# Patient Record
Sex: FEMALE | Race: WHITE | Employment: UNEMPLOYED | URBAN - METROPOLITAN AREA
[De-identification: names, ages, dates, MRNs, and addresses within clinical notes are randomized per-mention and may not be internally consistent; named-entity substitution may affect disease eponyms.]

---

## 2017-12-01 PROBLEM — M25.374: Status: ACTIVE | Noted: 2017-12-01

## 2017-12-01 PROBLEM — S86.111S: Status: ACTIVE | Noted: 2017-12-01

## 2018-03-14 ENCOUNTER — HOSPITAL ENCOUNTER (OUTPATIENT)
Dept: ULTRASOUND IMAGING | Age: 68
Discharge: HOME | End: 2018-03-14
Payer: MEDICARE

## 2018-03-14 DIAGNOSIS — E04.2 AUTOSOMAL DOMINANT MULTINODULAR GOITER ASSOCIATED WITH MUTATION IN DICER1 GENE: ICD-10-CM

## 2018-03-14 PROCEDURE — 76536 US EXAM OF HEAD AND NECK: CPT

## 2018-06-04 ENCOUNTER — APPOINTMENT (OUTPATIENT)
Dept: GENERAL RADIOLOGY | Age: 68
End: 2018-06-04
Payer: MEDICARE

## 2018-06-04 ENCOUNTER — HOSPITAL ENCOUNTER (EMERGENCY)
Age: 68
Discharge: HOME | End: 2018-06-04
Payer: MEDICARE

## 2018-06-04 VITALS
HEIGHT: 65 IN | HEART RATE: 62 BPM | TEMPERATURE: 98 F | WEIGHT: 247 LBS | DIASTOLIC BLOOD PRESSURE: 97 MMHG | RESPIRATION RATE: 18 BRPM | OXYGEN SATURATION: 95 % | SYSTOLIC BLOOD PRESSURE: 156 MMHG | BODY MASS INDEX: 41.15 KG/M2

## 2018-06-04 DIAGNOSIS — M79.622 LEFT UPPER ARM PAIN: ICD-10-CM

## 2018-06-04 DIAGNOSIS — S80.01XA CONTUSION OF RIGHT KNEE, INITIAL ENCOUNTER: ICD-10-CM

## 2018-06-04 DIAGNOSIS — S40.012A CONTUSION OF LEFT SHOULDER, INITIAL ENCOUNTER: Primary | ICD-10-CM

## 2018-06-04 PROCEDURE — 73060 X-RAY EXAM OF HUMERUS: CPT

## 2018-06-04 PROCEDURE — 99284 EMERGENCY DEPT VISIT MOD MDM: CPT

## 2018-06-04 PROCEDURE — 73030 X-RAY EXAM OF SHOULDER: CPT

## 2018-06-04 ASSESSMENT — PAIN DESCRIPTION - LOCATION: LOCATION: SHOULDER

## 2018-06-04 ASSESSMENT — PAIN SCALES - GENERAL: PAINLEVEL_OUTOF10: 8

## 2018-06-04 ASSESSMENT — PAIN DESCRIPTION - ORIENTATION: ORIENTATION: LEFT

## 2018-09-06 ENCOUNTER — HOSPITAL ENCOUNTER (OUTPATIENT)
Age: 68
Discharge: HOME | End: 2018-09-06
Payer: MEDICARE

## 2018-09-06 LAB
EKG ATRIAL RATE: 63 BPM
EKG P AXIS: 33 DEGREES
EKG P-R INTERVAL: 132 MS
EKG Q-T INTERVAL: 402 MS
EKG QRS DURATION: 78 MS
EKG QTC CALCULATION (BAZETT): 411 MS
EKG R AXIS: 39 DEGREES
EKG T AXIS: 26 DEGREES
EKG VENTRICULAR RATE: 63 BPM

## 2018-09-06 PROCEDURE — 93005 ELECTROCARDIOGRAM TRACING: CPT | Performed by: ANESTHESIOLOGY

## 2018-09-07 ENCOUNTER — HOSPITAL ENCOUNTER (OUTPATIENT)
Age: 68
Setting detail: OUTPATIENT SURGERY
Discharge: HOME | End: 2018-09-07
Attending: PODIATRIST | Admitting: PODIATRIST
Payer: MEDICARE

## 2018-09-07 ENCOUNTER — ANESTHESIA EVENT (OUTPATIENT)
Dept: OPERATING ROOM | Age: 68
End: 2018-09-07
Payer: MEDICARE

## 2018-09-07 ENCOUNTER — ANESTHESIA (OUTPATIENT)
Dept: OPERATING ROOM | Age: 68
End: 2018-09-07
Payer: MEDICARE

## 2018-09-07 VITALS — SYSTOLIC BLOOD PRESSURE: 112 MMHG | DIASTOLIC BLOOD PRESSURE: 64 MMHG | TEMPERATURE: 98.6 F | OXYGEN SATURATION: 99 %

## 2018-09-07 VITALS
BODY MASS INDEX: 41.15 KG/M2 | TEMPERATURE: 98.6 F | OXYGEN SATURATION: 96 % | HEIGHT: 65 IN | WEIGHT: 247 LBS | HEART RATE: 50 BPM | RESPIRATION RATE: 14 BRPM | SYSTOLIC BLOOD PRESSURE: 155 MMHG | DIASTOLIC BLOOD PRESSURE: 71 MMHG

## 2018-09-07 DIAGNOSIS — S86.111S POSTERIOR TIBIAL TENDON TEAR, TRAUMATIC, RIGHT, SEQUELA: ICD-10-CM

## 2018-09-07 DIAGNOSIS — T85.848D PAIN FROM IMPLANTED HARDWARE, SUBSEQUENT ENCOUNTER: Primary | ICD-10-CM

## 2018-09-07 DIAGNOSIS — Z47.2 AFTERCARE FOR REMOVAL OF FRACTURE PLATE OR INTERNAL FIXATION DEVICE: ICD-10-CM

## 2018-09-07 PROBLEM — T85.848A PAIN FROM IMPLANTED HARDWARE: Status: ACTIVE | Noted: 2018-09-07

## 2018-09-07 PROCEDURE — 6360000002 HC RX W HCPCS: Performed by: NURSE ANESTHETIST, CERTIFIED REGISTERED

## 2018-09-07 PROCEDURE — 3600000003 HC SURGERY LEVEL 3 BASE: Performed by: PODIATRIST

## 2018-09-07 PROCEDURE — 2709999900 HC NON-CHARGEABLE SUPPLY: Performed by: PODIATRIST

## 2018-09-07 PROCEDURE — 2500000003 HC RX 250 WO HCPCS: Performed by: PODIATRIST

## 2018-09-07 PROCEDURE — 3700000000 HC ANESTHESIA ATTENDED CARE: Performed by: PODIATRIST

## 2018-09-07 PROCEDURE — 2580000003 HC RX 258: Performed by: PODIATRIST

## 2018-09-07 PROCEDURE — 3600000013 HC SURGERY LEVEL 3 ADDTL 15MIN: Performed by: PODIATRIST

## 2018-09-07 PROCEDURE — 6360000002 HC RX W HCPCS: Performed by: PODIATRIST

## 2018-09-07 PROCEDURE — 3700000001 HC ADD 15 MINUTES (ANESTHESIA): Performed by: PODIATRIST

## 2018-09-07 PROCEDURE — C9359 IMPLNT,BON VOID FILLER-PUTTY: HCPCS | Performed by: PODIATRIST

## 2018-09-07 PROCEDURE — 7100000011 HC PHASE II RECOVERY - ADDTL 15 MIN: Performed by: PODIATRIST

## 2018-09-07 PROCEDURE — 2580000003 HC RX 258: Performed by: INTERNAL MEDICINE

## 2018-09-07 PROCEDURE — C1713 ANCHOR/SCREW BN/BN,TIS/BN: HCPCS | Performed by: PODIATRIST

## 2018-09-07 PROCEDURE — 7100000010 HC PHASE II RECOVERY - FIRST 15 MIN: Performed by: PODIATRIST

## 2018-09-07 DEVICE — INJECTABLE PUTTY
Type: IMPLANTABLE DEVICE | Site: FOOT | Status: FUNCTIONAL
Brand: ALLOMATRIX

## 2018-09-07 RX ORDER — HYDROCODONE BITARTRATE AND ACETAMINOPHEN 5; 325 MG/1; MG/1
2 TABLET ORAL PRN
Status: DISCONTINUED | OUTPATIENT
Start: 2018-09-07 | End: 2018-09-07 | Stop reason: HOSPADM

## 2018-09-07 RX ORDER — FENTANYL CITRATE 50 UG/ML
25 INJECTION, SOLUTION INTRAMUSCULAR; INTRAVENOUS EVERY 5 MIN PRN
Status: DISCONTINUED | OUTPATIENT
Start: 2018-09-07 | End: 2018-09-07 | Stop reason: HOSPADM

## 2018-09-07 RX ORDER — SODIUM CHLORIDE, SODIUM LACTATE, POTASSIUM CHLORIDE, CALCIUM CHLORIDE 600; 310; 30; 20 MG/100ML; MG/100ML; MG/100ML; MG/100ML
INJECTION, SOLUTION INTRAVENOUS CONTINUOUS
Status: DISCONTINUED | OUTPATIENT
Start: 2018-09-07 | End: 2018-09-07 | Stop reason: HOSPADM

## 2018-09-07 RX ORDER — FENTANYL CITRATE 50 UG/ML
50 INJECTION, SOLUTION INTRAMUSCULAR; INTRAVENOUS EVERY 5 MIN PRN
Status: DISCONTINUED | OUTPATIENT
Start: 2018-09-07 | End: 2018-09-07 | Stop reason: HOSPADM

## 2018-09-07 RX ORDER — PROPOFOL 10 MG/ML
INJECTION, EMULSION INTRAVENOUS CONTINUOUS PRN
Status: DISCONTINUED | OUTPATIENT
Start: 2018-09-07 | End: 2018-09-07 | Stop reason: SDUPTHER

## 2018-09-07 RX ORDER — KETOROLAC TROMETHAMINE 30 MG/ML
INJECTION, SOLUTION INTRAMUSCULAR; INTRAVENOUS PRN
Status: DISCONTINUED | OUTPATIENT
Start: 2018-09-07 | End: 2018-09-07 | Stop reason: SDUPTHER

## 2018-09-07 RX ORDER — OXYCODONE HYDROCHLORIDE AND ACETAMINOPHEN 5; 325 MG/1; MG/1
1 TABLET ORAL EVERY 6 HOURS PRN
Qty: 28 TABLET | Refills: 0 | Status: SHIPPED | OUTPATIENT
Start: 2018-09-07 | End: 2018-09-14

## 2018-09-07 RX ORDER — FENTANYL CITRATE 50 UG/ML
INJECTION, SOLUTION INTRAMUSCULAR; INTRAVENOUS PRN
Status: DISCONTINUED | OUTPATIENT
Start: 2018-09-07 | End: 2018-09-07 | Stop reason: SDUPTHER

## 2018-09-07 RX ORDER — MEPERIDINE HYDROCHLORIDE 25 MG/ML
12.5 INJECTION INTRAMUSCULAR; INTRAVENOUS; SUBCUTANEOUS EVERY 5 MIN PRN
Status: DISCONTINUED | OUTPATIENT
Start: 2018-09-07 | End: 2018-09-07 | Stop reason: HOSPADM

## 2018-09-07 RX ORDER — HYDROCODONE BITARTRATE AND ACETAMINOPHEN 5; 325 MG/1; MG/1
1 TABLET ORAL PRN
Status: DISCONTINUED | OUTPATIENT
Start: 2018-09-07 | End: 2018-09-07 | Stop reason: HOSPADM

## 2018-09-07 RX ORDER — MIDAZOLAM HYDROCHLORIDE 1 MG/ML
INJECTION INTRAMUSCULAR; INTRAVENOUS PRN
Status: DISCONTINUED | OUTPATIENT
Start: 2018-09-07 | End: 2018-09-07 | Stop reason: SDUPTHER

## 2018-09-07 RX ORDER — PROPOFOL 10 MG/ML
INJECTION, EMULSION INTRAVENOUS PRN
Status: DISCONTINUED | OUTPATIENT
Start: 2018-09-07 | End: 2018-09-07 | Stop reason: SDUPTHER

## 2018-09-07 RX ADMIN — KETOROLAC TROMETHAMINE 30 MG: 30 INJECTION, SOLUTION INTRAMUSCULAR at 11:30

## 2018-09-07 RX ADMIN — PROPOFOL 150 MCG/KG/MIN: 10 INJECTION, EMULSION INTRAVENOUS at 11:03

## 2018-09-07 RX ADMIN — VANCOMYCIN HYDROCHLORIDE 500 MG: 500 INJECTION, POWDER, LYOPHILIZED, FOR SOLUTION INTRAVENOUS at 10:44

## 2018-09-07 RX ADMIN — MIDAZOLAM 1 MG: 1 INJECTION INTRAMUSCULAR; INTRAVENOUS at 11:17

## 2018-09-07 RX ADMIN — MIDAZOLAM 1 MG: 1 INJECTION INTRAMUSCULAR; INTRAVENOUS at 10:56

## 2018-09-07 RX ADMIN — PROPOFOL 60 MG: 10 INJECTION, EMULSION INTRAVENOUS at 11:03

## 2018-09-07 RX ADMIN — PROPOFOL 20 MG: 10 INJECTION, EMULSION INTRAVENOUS at 10:56

## 2018-09-07 RX ADMIN — FENTANYL CITRATE 50 MCG: 50 INJECTION INTRAMUSCULAR; INTRAVENOUS at 11:00

## 2018-09-07 RX ADMIN — SODIUM CHLORIDE, POTASSIUM CHLORIDE, SODIUM LACTATE AND CALCIUM CHLORIDE: 600; 310; 30; 20 INJECTION, SOLUTION INTRAVENOUS at 10:10

## 2018-09-07 RX ADMIN — FENTANYL CITRATE 50 MCG: 50 INJECTION INTRAMUSCULAR; INTRAVENOUS at 10:56

## 2018-09-07 RX ADMIN — LIDOCAINE HYDROCHLORIDE 25 MG: 20 INJECTION, SOLUTION INTRAVENOUS at 11:03

## 2018-09-07 ASSESSMENT — PULMONARY FUNCTION TESTS
PIF_VALUE: 0

## 2018-09-07 ASSESSMENT — PAIN DESCRIPTION - DESCRIPTORS: DESCRIPTORS: ACHING;DISCOMFORT

## 2018-09-07 ASSESSMENT — PAIN SCALES - GENERAL
PAINLEVEL_OUTOF10: 0

## 2018-09-07 ASSESSMENT — PAIN - FUNCTIONAL ASSESSMENT: PAIN_FUNCTIONAL_ASSESSMENT: 0-10

## 2018-09-07 NOTE — BRIEF OP NOTE
Brief Postoperative Note  ______________________________________________________________    Patient: Camila Goldmann  YOB: 1950  MRN: [id number]  Date of Procedure: 9/7/2018    Pre-Op Diagnosis: SURGICAL FOREIGN BODY    Post-Op Diagnosis: Same       Procedure(s):  REMOVAL OF SURGICAL SCREW RIGHT FOOT, WITH BONE ALLOMATRIX PUTTY INJECTION    Anesthesia: Monitor Anesthesia Care    Surgeon(s):  Chilango Nelson DPM    Staff:  First Assistant: Lalo Salgado RN  Scrub Person First: Caleb Campbell     Estimated Blood Loss: * No values recorded between 9/7/2018 10:55 AM and 9/7/2018 69:65 AM * mL    Complications: None    Specimens:   * No specimens in log *    Implants:    Implant Name Type Inv.  Item Serial No.  Lot No. LRB No. Used   MUC SCREW    Poliana  Right 1   ALLEY-PUTTY SUBST DBM INJ 1CC Bone/Graft/Tissue ALLEY-PUTTY SUBST [facility] INJ [facility] [employer] [id number] Right 1         Drains:      Findings: consistent with Dx    Chilango Champion DPM  Date: 9/7/2018  Time: 11:38 AM

## 2018-09-07 NOTE — ANESTHESIA PRE PROCEDURE
Department of Anesthesiology  Preprocedure Note       Name:  Rocio Hare   Age:  79 y.o.  :  1950                                          MRN:  [id number]         Date:  2018      Surgeon: Li Hook):  Wilbert Rayo DPM    Procedure: Procedure(s):  REMOVAL OF SURGICAL SCREW RIGHT FOOT    Medications prior to admission:   Prior to Admission medications    Medication Sig Start Date End Date Taking? Authorizing Provider   Cimetidine (TAGAMET PO) Take by mouth    Historical Provider, MD   vitamin D (CHOLECALCIFEROL) 1000 UNIT TABS tablet Take 1,000 Units by mouth daily    Historical Provider, MD   Glucosamine 750 MG TABS Take by mouth    Historical Provider, MD       Current medications:    Current Facility-Administered Medications   Medication Dose Route Frequency Provider Last Rate Last Dose    vancomycin (VANCOCIN) 500 mg in dextrose 5 % 100 mL IVPB (mini-bag)  500 mg Intravenous Once Wilbert Rayo DPM        lactated ringers infusion   Intravenous Continuous Jonny Deleon  mL/hr at 18 1010      sodium chloride 0.9 % 1,000 mL with bacitracin 50,000 Units, polymyxin B 500,000 Units    PRN Wilbert Rayo, DPM   1,000 mL at 18 1100    bupivacaine (MARCAINE) 5 mL, lidocaine 2 % 5 mL    PRN Wilbert Perla., DPM   10 mL at 18 1100       Allergies:     Allergies   Allergen Reactions    Ciprofloxacin      itch    Keflex [Cephalexin] Itching    Sulfa Antibiotics Rash       Problem List:    Patient Active Problem List   Diagnosis Code    Posterior tibial tendon tear, traumatic, right, sequela S86.111S    Subtalar joint instability, 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

## 2018-09-08 NOTE — OP NOTE
[address]                                 OPERATIVE REPORT    PATIENT NAME: Taryn Mak                  :        1950  MED REC NO:   [id number]                            ROOM:  ACCOUNT NO:   [de-identified]                           ADMIT DATE: 2018  PROVIDER:     María Elena Rod DPM    DATE OF PROCEDURE:  2018    PREOPERATIVE DIAGNOSIS:  Painful surgical screw, right heel. POSTOPERATIVE DIAGNOSIS:  Painful surgical screw, right heel. PROCEDURE PERFORMED:  Removal of painful surgical screw, right heel. SURGEON:  María Elena Rod DPM    ASSISTANT:  None. ANESTHESIA:  Local with IV sedation. DESCRIPTION OF PROCEDURE:  The patient presented to the operating room and  placed on the operating table in the supine position. The patient  underwent IV sedation, was administered by the Department of Anesthesiology  of the [facility]. Once the patient was sedated, the  patient's operative foot was localized by the surgeon, comprising with  equal mixture of 0.5% Marcaine plain along with 2% lidocaine plain,  totaling 10 mL. After which time, the patient's foot was prepared,  scrubbed and draped in a sterile fashion. No tourniquet or epinephrine  utilized. Attention was directed to the plantar aspect of the foot, where a linear  incision was centered over the original incision site, it was made with a  #10-blade. Incision was deepened with blunt dissection with a hemostat. Hemostat was able to find the head of the surgical screw. The K-wire was  then used to go through the surgical screw, this allowed the screwdriver to  lock into the head. The screw was then removed in a retrograde fashion. The surgical site was flushed with antibiotic solution. At this time,  AlloMatrix putty mixed with bone Matrix was then used to fill the void.    Once, it was filled, the

## 2019-04-24 ENCOUNTER — HOSPITAL ENCOUNTER (OUTPATIENT)
Dept: ULTRASOUND IMAGING | Age: 69
Discharge: HOME | End: 2019-04-24
Payer: MEDICARE

## 2019-04-24 DIAGNOSIS — E04.2 MULTINODULAR GOITER: ICD-10-CM

## 2019-04-24 PROCEDURE — 76536 US EXAM OF HEAD AND NECK: CPT

## 2019-06-25 ENCOUNTER — HOSPITAL ENCOUNTER (OUTPATIENT)
Dept: ULTRASOUND IMAGING | Age: 69
Discharge: HOME | End: 2019-06-25
Payer: MEDICARE

## 2019-06-25 DIAGNOSIS — E04.1 LEFT THYROID NODULE: ICD-10-CM

## 2019-06-25 PROCEDURE — 10005 FNA BX W/US GDN 1ST LES: CPT

## 2019-06-25 PROCEDURE — 88173 CYTOPATH EVAL FNA REPORT: CPT

## 2019-06-25 PROCEDURE — 88305 TISSUE EXAM BY PATHOLOGIST: CPT

## 2019-10-28 ENCOUNTER — HOSPITAL ENCOUNTER (OUTPATIENT)
Dept: ULTRASOUND IMAGING | Age: 69
Discharge: HOME | End: 2019-10-28
Payer: MEDICARE

## 2019-10-28 DIAGNOSIS — E04.2 MULTINODULAR GOITER: ICD-10-CM

## 2019-10-28 PROCEDURE — 76536 US EXAM OF HEAD AND NECK: CPT

## 2022-10-03 PROBLEM — R00.1 BRADYCARDIA: Status: ACTIVE | Noted: 2022-10-03

## 2022-10-03 PROBLEM — Z01.818 PRE-OP EVALUATION: Status: ACTIVE | Noted: 2022-10-03

## 2022-10-06 PROBLEM — M19.072 ARTHRITIS OF LEFT SUBTALAR JOINT: Status: ACTIVE | Noted: 2022-10-06

## 2022-10-06 PROBLEM — M19.079 ARTHRITIS, MIDFOOT: Status: ACTIVE | Noted: 2022-10-06

## 2022-11-02 PROBLEM — Z01.818 PRE-OP EVALUATION: Status: RESOLVED | Noted: 2022-10-03 | Resolved: 2022-11-02

## 2023-04-04 ENCOUNTER — TELEPHONE (OUTPATIENT)
Dept: PRIMARY CARE | Facility: CLINIC | Age: 73
End: 2023-04-04
Payer: MEDICARE

## 2023-04-04 DIAGNOSIS — N30.01 ACUTE CYSTITIS WITH HEMATURIA: Primary | ICD-10-CM

## 2023-04-04 PROBLEM — N39.0 URINARY TRACT INFECTION: Status: ACTIVE | Noted: 2023-04-04

## 2023-04-04 RX ORDER — NITROFURANTOIN 25; 75 MG/1; MG/1
100 CAPSULE ORAL EVERY 12 HOURS
COMMUNITY
Start: 2022-11-17 | End: 2023-04-04 | Stop reason: SDUPTHER

## 2023-04-04 RX ORDER — NITROFURANTOIN 25; 75 MG/1; MG/1
100 CAPSULE ORAL EVERY 12 HOURS
Qty: 10 CAPSULE | Refills: 0 | Status: SHIPPED | OUTPATIENT
Start: 2023-04-04 | End: 2023-06-21

## 2023-04-04 NOTE — TELEPHONE ENCOUNTER
Jenae Landa has a very bad uti with bleeding.  Multiple allergies.  Could she ge an antibiotic.  The las stuff she had was macrobid and that helped.  Uses [facility] [phone]

## 2023-06-19 ENCOUNTER — OFFICE VISIT (OUTPATIENT)
Dept: PRIMARY CARE | Facility: CLINIC | Age: 73
End: 2023-06-19
Payer: MEDICARE

## 2023-06-19 VITALS
DIASTOLIC BLOOD PRESSURE: 78 MMHG | WEIGHT: 255 LBS | HEIGHT: 64 IN | HEART RATE: 58 BPM | BODY MASS INDEX: 43.54 KG/M2 | SYSTOLIC BLOOD PRESSURE: 132 MMHG | OXYGEN SATURATION: 97 %

## 2023-06-19 DIAGNOSIS — I10 PRIMARY HYPERTENSION: Primary | ICD-10-CM

## 2023-06-19 DIAGNOSIS — Z12.31 SCREENING MAMMOGRAM, ENCOUNTER FOR: ICD-10-CM

## 2023-06-19 DIAGNOSIS — M19.079 ARTHRITIS, MIDFOOT: ICD-10-CM

## 2023-06-19 PROBLEM — S86.301A INJURY OF PERONEAL TENDON OF RIGHT FOOT: Status: ACTIVE | Noted: 2023-06-19

## 2023-06-19 PROBLEM — G89.29 CHRONIC PAIN OF RIGHT ANKLE: Status: ACTIVE | Noted: 2023-06-19

## 2023-06-19 PROBLEM — K63.5 COLON POLYP: Status: ACTIVE | Noted: 2023-06-19

## 2023-06-19 PROBLEM — R00.1 BRADYCARDIA: Status: RESOLVED | Noted: 2022-10-03 | Resolved: 2023-06-19

## 2023-06-19 PROBLEM — E78.1 ESSENTIAL HYPERTRIGLYCERIDEMIA: Status: ACTIVE | Noted: 2023-06-19

## 2023-06-19 PROBLEM — F41.9 ANXIETY: Status: ACTIVE | Noted: 2023-06-19

## 2023-06-19 PROBLEM — J30.9 ALLERGIC RHINITIS: Status: ACTIVE | Noted: 2023-06-19

## 2023-06-19 PROBLEM — E04.1 THYROID NODULE: Status: ACTIVE | Noted: 2023-06-19

## 2023-06-19 PROBLEM — M25.571 CHRONIC PAIN OF RIGHT ANKLE: Status: ACTIVE | Noted: 2023-06-19

## 2023-06-19 PROBLEM — M19.071 ARTHRITIS OF ANKLE, RIGHT: Status: ACTIVE | Noted: 2023-06-19

## 2023-06-19 PROBLEM — K21.9 GERD WITHOUT ESOPHAGITIS: Status: ACTIVE | Noted: 2023-06-19

## 2023-06-19 PROBLEM — M19.072 ARTHRITIS OF ANKLE, LEFT: Status: ACTIVE | Noted: 2023-06-19

## 2023-06-19 PROBLEM — S86.111S: Status: ACTIVE | Noted: 2017-12-01

## 2023-06-19 PROBLEM — H35.30 MACULAR DEGENERATION: Status: ACTIVE | Noted: 2023-06-19

## 2023-06-19 LAB
ALBUMIN (G/DL) IN SER/PLAS: 3.9 G/DL (ref 3.4–5)
ANION GAP IN SER/PLAS: 15 MMOL/L (ref 10–20)
BASOPHILS (10*3/UL) IN BLOOD BY AUTOMATED COUNT: 0.02 X10E9/L (ref 0–0.1)
BASOPHILS/100 LEUKOCYTES IN BLOOD BY AUTOMATED COUNT: 0.3 % (ref 0–2)
CALCIUM (MG/DL) IN SER/PLAS: 8.6 MG/DL (ref 8.6–10.3)
CARBON DIOXIDE, TOTAL (MMOL/L) IN SER/PLAS: 28 MMOL/L (ref 21–32)
CHLORIDE (MMOL/L) IN SER/PLAS: 100 MMOL/L (ref 98–107)
CHOLESTEROL (MG/DL) IN SER/PLAS: 184 MG/DL (ref 0–199)
CHOLESTEROL IN HDL (MG/DL) IN SER/PLAS: 40.5 MG/DL
CHOLESTEROL/HDL RATIO: 4.5
CREATININE (MG/DL) IN SER/PLAS: 0.96 MG/DL (ref 0.5–1.05)
EOSINOPHILS (10*3/UL) IN BLOOD BY AUTOMATED COUNT: 0.12 X10E9/L (ref 0–0.4)
EOSINOPHILS/100 LEUKOCYTES IN BLOOD BY AUTOMATED COUNT: 1.6 % (ref 0–6)
ERYTHROCYTE DISTRIBUTION WIDTH (RATIO) BY AUTOMATED COUNT: 15.4 % (ref 11.5–14.5)
ERYTHROCYTE MEAN CORPUSCULAR HEMOGLOBIN CONCENTRATION (G/DL) BY AUTOMATED: 30.3 G/DL (ref 32–36)
ERYTHROCYTE MEAN CORPUSCULAR VOLUME (FL) BY AUTOMATED COUNT: 83 FL (ref 80–100)
ERYTHROCYTES (10*6/UL) IN BLOOD BY AUTOMATED COUNT: 5.04 X10E12/L (ref 4–5.2)
GFR FEMALE: 63 ML/MIN/1.73M2
GLUCOSE (MG/DL) IN SER/PLAS: 83 MG/DL (ref 74–99)
HEMATOCRIT (%) IN BLOOD BY AUTOMATED COUNT: 41.9 % (ref 36–46)
HEMOGLOBIN (G/DL) IN BLOOD: 12.7 G/DL (ref 12–16)
IMMATURE GRANULOCYTES/100 LEUKOCYTES IN BLOOD BY AUTOMATED COUNT: 0.3 % (ref 0–0.9)
LDL: 109 MG/DL (ref 0–99)
LEUKOCYTES (10*3/UL) IN BLOOD BY AUTOMATED COUNT: 7.3 X10E9/L (ref 4.4–11.3)
LYMPHOCYTES (10*3/UL) IN BLOOD BY AUTOMATED COUNT: 1.92 X10E9/L (ref 0.8–3)
LYMPHOCYTES/100 LEUKOCYTES IN BLOOD BY AUTOMATED COUNT: 26.4 % (ref 13–44)
MONOCYTES (10*3/UL) IN BLOOD BY AUTOMATED COUNT: 0.38 X10E9/L (ref 0.05–0.8)
MONOCYTES/100 LEUKOCYTES IN BLOOD BY AUTOMATED COUNT: 5.2 % (ref 2–10)
NEUTROPHILS (10*3/UL) IN BLOOD BY AUTOMATED COUNT: 4.82 X10E9/L (ref 1.6–5.5)
NEUTROPHILS/100 LEUKOCYTES IN BLOOD BY AUTOMATED COUNT: 66.2 % (ref 40–80)
PHOSPHATE (MG/DL) IN SER/PLAS: 3.7 MG/DL (ref 2.5–4.9)
PLATELETS (10*3/UL) IN BLOOD AUTOMATED COUNT: 274 X10E9/L (ref 150–450)
POTASSIUM (MMOL/L) IN SER/PLAS: 4.1 MMOL/L (ref 3.5–5.3)
SODIUM (MMOL/L) IN SER/PLAS: 139 MMOL/L (ref 136–145)
TRIGLYCERIDE (MG/DL) IN SER/PLAS: 172 MG/DL (ref 0–149)
UREA NITROGEN (MG/DL) IN SER/PLAS: 25 MG/DL (ref 6–23)
VLDL: 34 MG/DL (ref 0–40)

## 2023-06-19 PROCEDURE — 85025 COMPLETE CBC W/AUTO DIFF WBC: CPT

## 2023-06-19 PROCEDURE — 3075F SYST BP GE 130 - 139MM HG: CPT | Performed by: FAMILY MEDICINE

## 2023-06-19 PROCEDURE — 99214 OFFICE O/P EST MOD 30 MIN: CPT | Performed by: FAMILY MEDICINE

## 2023-06-19 PROCEDURE — 1159F MED LIST DOCD IN RCRD: CPT | Performed by: FAMILY MEDICINE

## 2023-06-19 PROCEDURE — 1160F RVW MEDS BY RX/DR IN RCRD: CPT | Performed by: FAMILY MEDICINE

## 2023-06-19 PROCEDURE — 3078F DIAST BP <80 MM HG: CPT | Performed by: FAMILY MEDICINE

## 2023-06-19 PROCEDURE — 1170F FXNL STATUS ASSESSED: CPT | Performed by: FAMILY MEDICINE

## 2023-06-19 PROCEDURE — G0439 PPPS, SUBSEQ VISIT: HCPCS | Performed by: FAMILY MEDICINE

## 2023-06-19 PROCEDURE — 80069 RENAL FUNCTION PANEL: CPT

## 2023-06-19 PROCEDURE — 80061 LIPID PANEL: CPT

## 2023-06-19 PROCEDURE — 99397 PER PM REEVAL EST PAT 65+ YR: CPT | Performed by: FAMILY MEDICINE

## 2023-06-19 PROCEDURE — 1036F TOBACCO NON-USER: CPT | Performed by: FAMILY MEDICINE

## 2023-06-19 RX ORDER — CYCLOPENTOLATE HYDROCHLORIDE 10 MG/ML
SOLUTION/ DROPS OPHTHALMIC
COMMUNITY
Start: 2021-10-15

## 2023-06-19 RX ORDER — TRIAMTERENE AND HYDROCHLOROTHIAZIDE 37.5; 25 MG/1; MG/1
CAPSULE ORAL
COMMUNITY
Start: 2021-04-08 | End: 2023-06-19 | Stop reason: SDUPTHER

## 2023-06-19 RX ORDER — CHOLECALCIFEROL (VITAMIN D3) 25 MCG
1000 TABLET ORAL
COMMUNITY

## 2023-06-19 RX ORDER — ZINC GLUCONATE 13.3 MG
LOZENGE ORAL
COMMUNITY

## 2023-06-19 RX ORDER — IBUPROFEN 200 MG
200 TABLET ORAL EVERY 6 HOURS PRN
COMMUNITY
End: 2024-02-03 | Stop reason: ALTCHOICE

## 2023-06-19 RX ORDER — MULTIVITAMIN
1 TABLET ORAL DAILY
COMMUNITY
End: 2023-06-19 | Stop reason: ALTCHOICE

## 2023-06-19 RX ORDER — DICLOFENAC SODIUM 10 MG/G
4 GEL TOPICAL 4 TIMES DAILY PRN
Qty: 20 G | Refills: 0 | COMMUNITY
Start: 2023-06-19 | End: 2023-07-09

## 2023-06-19 RX ORDER — ASPIRIN 81 MG/1
1 TABLET ORAL
COMMUNITY
Start: 2022-10-06 | End: 2024-02-03 | Stop reason: ALTCHOICE

## 2023-06-19 RX ORDER — TRIAMTERENE AND HYDROCHLOROTHIAZIDE 37.5; 25 MG/1; MG/1
CAPSULE ORAL
Qty: 90 CAPSULE | Refills: 3 | Status: SHIPPED | OUTPATIENT
Start: 2023-06-19 | End: 2023-06-21

## 2023-06-19 ASSESSMENT — ENCOUNTER SYMPTOMS
APPETITE CHANGE: 0
FEVER: 0
CHILLS: 0
ACTIVITY CHANGE: 0
COUGH: 1

## 2023-06-19 ASSESSMENT — ACTIVITIES OF DAILY LIVING (ADL)
DOING_HOUSEWORK: INDEPENDENT
GROCERY_SHOPPING: INDEPENDENT
DRESSING: INDEPENDENT
BATHING: INDEPENDENT
TAKING_MEDICATION: INDEPENDENT
MANAGING_FINANCES: INDEPENDENT

## 2023-06-19 NOTE — PROGRESS NOTES
"Subjective   Reason for Visit: Jenae Delvalle is an 73 y.o. female here for a Medicare Wellness visit.     Past Medical, Surgical, and Family History reviewed and updated in chart.    Reviewed all medications by prescribing practitioner or clinical pharmacist (such as prescriptions, OTCs, herbal therapies and supplements) and documented in the medical record.    HPI  Patient also in for yearly checkup renewal of medicine  She takes triamterene hydrochlorothiazide for blood pressure also helps with edema in her legs  Patient did have foot surgery in the past  Chronic pain  Does limit her ambulation  Otherwise denies any shortness of breath chest pain pressure palpitations  He is getting over a cold she had a couple weeks ago is improving still with slight cough and postnasal drainage    Patient Care Team:  Juan Mooney DO as PCP - General  Juan Mooney DO as PCP - [employer] PCP     Review of Systems   Constitutional:  Negative for activity change, appetite change, chills and fever.   HENT:  Positive for postnasal drip. Negative for congestion.    Respiratory:  Positive for cough.         Sl cough getting better      Shots in R eye MD     Objective   Vitals:  BP (!) 130/94 (BP Location: Left arm, Patient Position: Sitting, BP Cuff Size: Large adult)   Pulse 58   Ht 1.619 m (5' 3.75\")   Wt 116 kg (255 lb)   SpO2 97%   BMI 44.11 kg/m²       Physical Exam  General: alert, no apparent distress, good hygiene   HEAD:  Normocephalic, atraumatic    EARS:  EAC patent, TMs normal,   EYES:  sclera white, ROMINA, conjunctiva noninjected  NOSE: Nasal passages patent   MOUTH: Pharynx clear, tongue uvula midline, grade 4 airway  Neck:  supple, no masses, thyroid non enlarged non nodular, no cervical adenopathy,  Lungs:  no wheezing, no rales , no rhonchi, normal respiratory pattern, breath sounds not diminished  Heart:  regular rate and  rhythm, 2/6 RUSB murmur, no ectopy, no S3 or S4, no carotid " bruits  Abdomen:  soft NT,BS + ,  no organomegaly, no masses, no bruits  Extremities:  trace R>L  edema,mild chronic stasis changes both ankles,+mod varicosities , no cyanosis, no clubbing,  2+ posterior tibialis pulse    Psych:  speech fluent, normal affect, normal thought process  Skin:  no rashes, no concerning skin lesions, normal texture      Assessment/Plan   Problem List Items Addressed This Visit       Arthritis, midfoot    Hypertension - Primary     Other Visit Diagnoses       Screening mammogram, encounter for            Discussed with patient should help any shortness of breath palpitations would recommend echocardiogram.  Continue current diuretic for blood pressure renewal sent for 1 year.  Lab done.  Patient does not do vaccines.  Pneumococcal and shingles vaccines would be recommended  Mammogram was ordered.

## 2023-06-21 DIAGNOSIS — Z12.31 SCREENING MAMMOGRAM, ENCOUNTER FOR: ICD-10-CM

## 2023-06-21 DIAGNOSIS — N30.01 ACUTE CYSTITIS WITH HEMATURIA: ICD-10-CM

## 2023-06-21 RX ORDER — TRIAMTERENE AND HYDROCHLOROTHIAZIDE 37.5; 25 MG/1; MG/1
CAPSULE ORAL
Qty: 90 CAPSULE | Refills: 3 | Status: SHIPPED | OUTPATIENT
Start: 2023-06-21

## 2023-06-21 RX ORDER — NITROFURANTOIN 25; 75 MG/1; MG/1
CAPSULE ORAL
Qty: 10 CAPSULE | Refills: 0 | Status: SHIPPED | OUTPATIENT
Start: 2023-06-21 | End: 2024-02-03 | Stop reason: ALTCHOICE

## 2023-07-18 ENCOUNTER — OFFICE VISIT (OUTPATIENT)
Dept: PRIMARY CARE | Facility: CLINIC | Age: 73
End: 2023-07-18
Payer: MEDICARE

## 2023-07-18 VITALS
BODY MASS INDEX: 44.32 KG/M2 | DIASTOLIC BLOOD PRESSURE: 82 MMHG | SYSTOLIC BLOOD PRESSURE: 132 MMHG | OXYGEN SATURATION: 98 % | HEART RATE: 82 BPM | WEIGHT: 256.2 LBS

## 2023-07-18 DIAGNOSIS — M75.82 TENDINITIS OF LEFT INFRASPINATUS TENDON: ICD-10-CM

## 2023-07-18 DIAGNOSIS — D17.22 LIPOMA OF LEFT UPPER EXTREMITY: Primary | ICD-10-CM

## 2023-07-18 PROCEDURE — 99213 OFFICE O/P EST LOW 20 MIN: CPT

## 2023-07-18 PROCEDURE — 1036F TOBACCO NON-USER: CPT

## 2023-07-18 PROCEDURE — 3079F DIAST BP 80-89 MM HG: CPT

## 2023-07-18 PROCEDURE — 1160F RVW MEDS BY RX/DR IN RCRD: CPT

## 2023-07-18 PROCEDURE — 1159F MED LIST DOCD IN RCRD: CPT

## 2023-07-18 PROCEDURE — 3075F SYST BP GE 130 - 139MM HG: CPT

## 2023-07-18 NOTE — PROGRESS NOTES
Subjective   Patient ID: Jenae Delvalle is a 73 y.o. female who presents for Arm Pain (Lump on L gwyn shoulder pain).  HPI  Jenae presents for a lump on her L arm that she noticed a week ago.  She says that she had her arm up on the steering wheel when she noticed it. It does not hurt. It has not gotten bigger, if anything she thinks maybe it has gotten smaller.   She also says her whole shoulder has been sore. She thinks she hurt it when she was using a push cultivator in the garden and when she push mowed the grass last week.  She says that working outside in the garden seems to continue to aggravate her shoulder.   She also uses a hoe to stablize herself while in the garden and leans her L arm on it and wonders if this makes the lump worse.   The lump has never been warm or hot to the touch.   She did have a lipoma on her back before.     Past Surgical History:   Procedure Laterality Date    CHOLECYSTECTOMY  03/21/2014    Cholecystectomy    COLONOSCOPY  11/14/2014    Colonoscopy (Fiberoptic)    ESOPHAGOGASTRODUODENOSCOPY  11/14/2014    Diagnostic Esophagogastroduodenoscopy    FOOT SURGERY  09/18/2018    Foot Surgery    OTHER SURGICAL HISTORY  03/21/2014    Ostectomy Calcaneus For Spur    OTHER SURGICAL HISTORY  09/19/2019    Split thickness skin graft    TUBAL LIGATION  03/21/2014    Tubal Ligation      Past Medical History:   Diagnosis Date    Acute candidiasis of vulva and vagina 03/24/2015    Yeast vaginitis    Acute maxillary sinusitis, unspecified 11/29/2017    Acute non-recurrent maxillary sinusitis    Chronic rhinitis 06/06/2013    Rhinitis    COVID-19 04/09/2021    COVID-19 virus infection    Other conditions influencing health status 06/25/2013    Mammogram    Other conditions influencing health status 06/25/2013    Leiomyomatous Degeneration Of The Uterus    Personal history of neoplasm of uncertain behavior 03/21/2014    History of neoplasm of uncertain behavior    Personal history of other diseases of  the digestive system 10/17/2014    History of acute gastritis    Personal history of other diseases of the respiratory system 11/29/2017    History of bronchitis    Personal history of other endocrine, nutritional and metabolic disease 03/21/2014    History of obesity    Personal history of other infectious and parasitic diseases 03/27/2015    History of athlete's foot    Personal history of other malignant neoplasm of skin     History of skin cancer    Personal history of other malignant neoplasm of skin     History of malignant neoplasm of skin    Personal history of other specified conditions 12/11/2017    History of dysuria    Personal history of urinary (tract) infections 12/22/2017    History of urinary tract infection     Social History     Tobacco Use    Smoking status: Never    Smokeless tobacco: Never   Substance Use Topics    Drug use: Never        Review of Systems  10 point review of systems performed and is negative except as noted in the HPI.      Current Outpatient Medications:     aspirin 81 mg EC tablet, Take 1 tablet (81 mg) by mouth once daily., Disp: , Rfl:     cholecalciferol (Vitamin D-3) 25 MCG (1000 UT) tablet, Take 1 tablet (1,000 Units) by mouth once daily., Disp: , Rfl:     cimetidine (Tagamet) 200 mg tablet, Take by mouth., Disp: , Rfl:     ibuprofen 200 mg tablet, Take 1 tablet (200 mg) by mouth every 6 hours if needed., Disp: , Rfl:     bf-qr-EH-vit T-kmmqx-hvgb-zeax (Ocuvite Eye Plus Multi) 200- mcg tablet, Take by mouth., Disp: , Rfl:     nitrofurantoin, macrocrystal-monohydrate, (Macrobid) 100 mg capsule, TAKE ONE CAPSULE BY MOUTH TWO TIMES A DAY, once in the morning and once in the evening, Disp: 10 capsule, Rfl: 0    triamterene-hydrochlorothiazid (Dyazide) 37.5-25 mg capsule, TAKE ONE CAPSULE BY MOUTH EVERY DAY, Disp: 90 capsule, Rfl: 3     Objective   /82   Pulse 82   Wt 116 kg (256 lb 3.2 oz)   SpO2 98%   BMI 44.32 kg/m²     Physical Exam  Vitals reviewed.    Constitutional:       Appearance: Normal appearance.   HENT:      Head: Normocephalic and atraumatic.   Cardiovascular:      Rate and Rhythm: Normal rate and regular rhythm.      Pulses: Normal pulses.      Heart sounds: Murmur (RUSB) heard.   Pulmonary:      Effort: Pulmonary effort is normal.      Breath sounds: Normal breath sounds. No wheezing, rhonchi or rales.   Musculoskeletal:      Left shoulder: Tenderness (anteriorlateral of shoulder) present. No swelling, deformity or bony tenderness. Normal range of motion. Normal strength. Normal pulse.      Left upper arm: Normal. No swelling, edema or tenderness.      Left elbow: Normal. No swelling. Normal range of motion. No tenderness.        Arms:       Comments: Small mobile, rubbery feeling, lump on L upper extremity right distal to antecubital fossa. Non-tender when palpated. No erythema, fluctuance, or warmth.   L shoulder: positive open can test and pain with abduction. ROM WNL. Negative neer, velasquez, drop arm, apprehension, Yergason.    strength WNL b/l.   Skin:     General: Skin is warm and dry.      Findings: No bruising, erythema or rash.   Neurological:      Mental Status: She is alert and oriented to person, place, and time.      Motor: No weakness.   Psychiatric:         Mood and Affect: Mood normal.         Behavior: Behavior normal.       Assessment/Plan   Problem List Items Addressed This Visit    None  Visit Diagnoses       Lipoma of left upper extremity    -  Primary    Tendinitis of left infraspinatus tendon            Lipoma   Monitor, if it does not bother Jenae she can continue to watch it  If it changes in size or she wants it removed then surgery     Infraspinatus tendinitis   Rest, try to decrease aggravating motions   Heat, stretching, tylenol   Follow up if not improving     Discussed at visit any disease processes that were of concern as well as the risks, benefits and instructions on any new medication provided. Patient (and/or  caretaker of patient if present) stated all questions were answered, and they voiced understanding of instructions.

## 2023-10-23 ENCOUNTER — OFFICE VISIT (OUTPATIENT)
Dept: PRIMARY CARE | Facility: CLINIC | Age: 73
End: 2023-10-23
Payer: MEDICARE

## 2023-10-23 VITALS
WEIGHT: 256 LBS | DIASTOLIC BLOOD PRESSURE: 80 MMHG | HEART RATE: 59 BPM | SYSTOLIC BLOOD PRESSURE: 120 MMHG | BODY MASS INDEX: 44.29 KG/M2 | OXYGEN SATURATION: 98 %

## 2023-10-23 DIAGNOSIS — I80.01 SUPERFICIAL PHLEBITIS AND THROMBOPHLEBITIS OF RIGHT LOWER EXTREMITY: Primary | ICD-10-CM

## 2023-10-23 PROCEDURE — 1160F RVW MEDS BY RX/DR IN RCRD: CPT

## 2023-10-23 PROCEDURE — 3079F DIAST BP 80-89 MM HG: CPT

## 2023-10-23 PROCEDURE — 3074F SYST BP LT 130 MM HG: CPT

## 2023-10-23 PROCEDURE — 99213 OFFICE O/P EST LOW 20 MIN: CPT

## 2023-10-23 PROCEDURE — 1159F MED LIST DOCD IN RCRD: CPT

## 2023-10-23 PROCEDURE — 1036F TOBACCO NON-USER: CPT

## 2023-10-23 NOTE — PATIENT INSTRUCTIONS
Superficial thrombophlebitis - monitor, not in the right spot for it to break off to travel but keep an eye on it  Aspirin 81   Elevate  Do not sit for too long  Can try warm compresses on the area  Let us know if you would like an order to see Dr Velasco - he is in town on Wednesday

## 2023-10-23 NOTE — PROGRESS NOTES
"Subjective   Patient ID: Jenae Delvalle is a 73 y.o. female who presents for Leg Pain and Leg Swelling (R leg).  HPI  Jenae presents for a lump in her R leg that is tender that she noticed today at 230/3pm.   She states that when she first noticed it, it stuck \"way out.\"  Now it has gone down in size, but it is tender if you press on it. She has not bumped her leg on anything that she knows of.  She was working out at [employer], she has been up and moving around a lot.   She states that it did not get hot or red but it looks bruised.   She wanted to make sure it is not a blood clot.   The rest of her leg is not swollen.  She denies sitting in a car for a long period of time, recent travel, hospitalization, pain in her R calf, SOB, chest pain, or sitting for long periods of time.     Past Surgical History:   Procedure Laterality Date    CHOLECYSTECTOMY  03/21/2014    Cholecystectomy    COLONOSCOPY  11/14/2014    Colonoscopy (Fiberoptic)    ESOPHAGOGASTRODUODENOSCOPY  11/14/2014    Diagnostic Esophagogastroduodenoscopy    FOOT SURGERY  09/18/2018    Foot Surgery    OTHER SURGICAL HISTORY  03/21/2014    Ostectomy Calcaneus For Spur    OTHER SURGICAL HISTORY  09/19/2019    Split thickness skin graft    TUBAL LIGATION  03/21/2014    Tubal Ligation      Past Medical History:   Diagnosis Date    Acute candidiasis of vulva and vagina 03/24/2015    Yeast vaginitis    Acute maxillary sinusitis, unspecified 11/29/2017    Acute non-recurrent maxillary sinusitis    Chronic rhinitis 06/06/2013    Rhinitis    COVID-19 04/09/2021    COVID-19 virus infection    Other conditions influencing health status 06/25/2013    Mammogram    Other conditions influencing health status 06/25/2013    Leiomyomatous Degeneration Of The Uterus    Personal history of neoplasm of uncertain behavior 03/21/2014    History of neoplasm of uncertain behavior    Personal history of other diseases of the digestive system 10/17/2014    History of acute gastritis    " Personal history of other diseases of the respiratory system 11/29/2017    History of bronchitis    Personal history of other endocrine, nutritional and metabolic disease 03/21/2014    History of obesity    Personal history of other infectious and parasitic diseases 03/27/2015    History of athlete's foot    Personal history of other malignant neoplasm of skin     History of skin cancer    Personal history of other malignant neoplasm of skin     History of malignant neoplasm of skin    Personal history of other specified conditions 12/11/2017    History of dysuria    Personal history of urinary (tract) infections 12/22/2017    History of urinary tract infection     Social History     Tobacco Use    Smoking status: Never    Smokeless tobacco: Never   Substance Use Topics    Drug use: Never        Review of Systems  10 point review of systems performed and is negative except as noted in the HPI.      Current Outpatient Medications:     aspirin 81 mg EC tablet, Take 1 tablet (81 mg) by mouth once daily., Disp: , Rfl:     cholecalciferol (Vitamin D-3) 25 MCG (1000 UT) tablet, Take 1 tablet (1,000 Units) by mouth once daily., Disp: , Rfl:     cimetidine (Tagamet) 200 mg tablet, Take by mouth., Disp: , Rfl:     ibuprofen 200 mg tablet, Take 1 tablet (200 mg) by mouth every 6 hours if needed., Disp: , Rfl:     mp-kb-GE-vit V-jloal-lafn-zeax (Ocuvite Eye Plus Multi) 200- mcg tablet, Take by mouth., Disp: , Rfl:     nitrofurantoin, macrocrystal-monohydrate, (Macrobid) 100 mg capsule, TAKE ONE CAPSULE BY MOUTH TWO TIMES A DAY, once in the morning and once in the evening, Disp: 10 capsule, Rfl: 0    triamterene-hydrochlorothiazid (Dyazide) 37.5-25 mg capsule, TAKE ONE CAPSULE BY MOUTH EVERY DAY, Disp: 90 capsule, Rfl: 3     Objective   /80   Pulse 59   Wt 116 kg (256 lb)   SpO2 98%   BMI 44.29 kg/m²     Physical Exam  Vitals reviewed.   Constitutional:       General: She is not in acute distress.      Appearance: Normal appearance. She is not ill-appearing.   HENT:      Head: Normocephalic and atraumatic.   Eyes:      Conjunctiva/sclera: Conjunctivae normal.      Pupils: Pupils are equal, round, and reactive to light.   Cardiovascular:      Rate and Rhythm: Normal rate and regular rhythm.      Pulses: Normal pulses.      Heart sounds: Murmur (RUSB) heard.   Pulmonary:      Effort: Pulmonary effort is normal.      Breath sounds: Normal breath sounds. No wheezing, rhonchi or rales.   Musculoskeletal:         General: Normal range of motion.      Right knee: No tenderness (not tender over popliteal fossa).      Left knee: No tenderness.      Right lower leg: No swelling. No edema.      Left lower leg: No swelling. No edema.      Right ankle: Normal.      Left ankle: Normal.        Legs:       Comments: Small lump palpated on lateral posterior side of R lower leg, distal and lateral to the popliteal fossa. It is black/blue appearing in color. It is tender when palpated. It does not feel hard. It is not warm to the touch.   Superficial veins also easily palpated.   R calf is non-tender when palpated and not swollen.    Skin:     General: Skin is warm and dry.      Findings: No erythema.   Neurological:      Mental Status: She is alert and oriented to person, place, and time.   Psychiatric:         Mood and Affect: Mood normal.         Behavior: Behavior normal.         Assessment/Plan   Problem List Items Addressed This Visit    None  Visit Diagnoses       Superficial phlebitis and thrombophlebitis of right lower extremity    -  Primary        Discussed that the lump is likely due to superficial thrombophlebitis and Jenae needs to keep an eye on it - low suspicion for acute DVT - discussed case with Dr. Mooney   She can take aspirin 81mg  She should elevate the leg, do not sit for too long, can try warm compresses on the area  Discussed if pain worsens or leg swells to call - then US can be ordered     Discussed at  visit any disease processes that were of concern as well as the risks, benefits and instructions on any new medication provided. Patient (and/or caretaker of patient if present) stated all questions were answered, and they voiced understanding of instructions.

## 2023-12-20 PROBLEM — H35.3211 EXUDATIVE AGE-RELATED MACULAR DEGENERATION OF RIGHT EYE WITH ACTIVE CHOROIDAL NEOVASCULARIZATION (MULTI): Status: ACTIVE | Noted: 2023-12-20

## 2024-02-01 ENCOUNTER — HOSPITAL ENCOUNTER (EMERGENCY)
Age: 74
Discharge: HOME | End: 2024-02-01
Attending: EMERGENCY MEDICINE
Payer: MEDICARE

## 2024-02-01 ENCOUNTER — APPOINTMENT (OUTPATIENT)
Dept: CT IMAGING | Age: 74
End: 2024-02-01
Payer: MEDICARE

## 2024-02-01 VITALS
WEIGHT: 250 LBS | HEART RATE: 63 BPM | RESPIRATION RATE: 16 BRPM | SYSTOLIC BLOOD PRESSURE: 171 MMHG | OXYGEN SATURATION: 98 % | BODY MASS INDEX: 41.65 KG/M2 | TEMPERATURE: 98 F | HEIGHT: 65 IN | DIASTOLIC BLOOD PRESSURE: 75 MMHG

## 2024-02-01 DIAGNOSIS — R10.84 GENERALIZED ABDOMINAL PAIN: Primary | ICD-10-CM

## 2024-02-01 LAB
ALBUMIN SERPL-MCNC: 4.1 G/DL (ref 3.5–5.2)
ALP SERPL-CCNC: 130 U/L (ref 35–104)
ALT SERPL-CCNC: 21 U/L (ref 0–32)
ANION GAP SERPL CALCULATED.3IONS-SCNC: 11 MMOL/L (ref 7–16)
AST SERPL-CCNC: 19 U/L (ref 0–31)
BASOPHILS # BLD: 0.02 K/UL (ref 0–0.2)
BASOPHILS NFR BLD: 0 % (ref 0–2)
BILIRUB DIRECT SERPL-MCNC: <0.2 MG/DL (ref 0–0.3)
BILIRUB INDIRECT SERPL-MCNC: ABNORMAL MG/DL (ref 0–1)
BILIRUB SERPL-MCNC: 0.5 MG/DL (ref 0–1.2)
BILIRUB UR QL STRIP: NEGATIVE
BUN SERPL-MCNC: 16 MG/DL (ref 6–23)
CALCIUM SERPL-MCNC: 9.1 MG/DL (ref 8.6–10.2)
CHLORIDE SERPL-SCNC: 98 MMOL/L (ref 98–107)
CLARITY UR: CLEAR
CO2 SERPL-SCNC: 27 MMOL/L (ref 22–29)
COLOR UR: YELLOW
CREAT SERPL-MCNC: 1 MG/DL (ref 0.5–1)
EOSINOPHIL # BLD: 0.02 K/UL (ref 0.05–0.5)
EOSINOPHILS RELATIVE PERCENT: 0 % (ref 0–6)
EPI CELLS #/AREA URNS HPF: NORMAL /HPF
ERYTHROCYTE [DISTWIDTH] IN BLOOD BY AUTOMATED COUNT: 14.7 % (ref 11.5–15)
GFR SERPL CREATININE-BSD FRML MDRD: >60 ML/MIN/1.73M2
GLUCOSE SERPL-MCNC: 118 MG/DL (ref 74–99)
GLUCOSE UR STRIP-MCNC: NEGATIVE MG/DL
HCT VFR BLD AUTO: 41.6 % (ref 34–48)
HGB BLD-MCNC: 13.4 G/DL (ref 11.5–15.5)
HGB UR QL STRIP.AUTO: NEGATIVE
IMM GRANULOCYTES # BLD AUTO: <0.03 K/UL (ref 0–0.58)
IMM GRANULOCYTES NFR BLD: 0 % (ref 0–5)
KETONES UR STRIP-MCNC: 15 MG/DL
LACTATE BLDV-SCNC: 1.4 MMOL/L (ref 0.5–2.2)
LEUKOCYTE ESTERASE UR QL STRIP: ABNORMAL
LIPASE SERPL-CCNC: 28 U/L (ref 13–60)
LYMPHOCYTES NFR BLD: 1.57 K/UL (ref 1.5–4)
LYMPHOCYTES RELATIVE PERCENT: 19 % (ref 20–42)
MAGNESIUM SERPL-MCNC: 2.3 MG/DL (ref 1.6–2.6)
MCH RBC QN AUTO: 25.9 PG (ref 26–35)
MCHC RBC AUTO-ENTMCNC: 32.2 G/DL (ref 32–34.5)
MCV RBC AUTO: 80.3 FL (ref 80–99.9)
MONOCYTES NFR BLD: 0.37 K/UL (ref 0.1–0.95)
MONOCYTES NFR BLD: 5 % (ref 2–12)
MUCOUS THREADS URNS QL MICRO: PRESENT
NEUTROPHILS NFR BLD: 76 % (ref 43–80)
NEUTS SEG NFR BLD: 6.21 K/UL (ref 1.8–7.3)
NITRITE UR QL STRIP: NEGATIVE
PH UR STRIP: 6 [PH] (ref 5–9)
PLATELET # BLD AUTO: 295 K/UL (ref 130–450)
PMV BLD AUTO: 9.2 FL (ref 7–12)
POTASSIUM SERPL-SCNC: 3.9 MMOL/L (ref 3.5–5)
PROT SERPL-MCNC: 7.3 G/DL (ref 6.4–8.3)
PROT UR STRIP-MCNC: NEGATIVE MG/DL
RBC # BLD AUTO: 5.18 M/UL (ref 3.5–5.5)
RBC #/AREA URNS HPF: NORMAL /HPF
SODIUM SERPL-SCNC: 136 MMOL/L (ref 132–146)
SP GR UR STRIP: 1.01 (ref 1–1.03)
TROPONIN I SERPL HS-MCNC: 17 NG/L (ref 0–9)
TROPONIN I SERPL HS-MCNC: 19 NG/L (ref 0–9)
UROBILINOGEN UR STRIP-ACNC: 1 EU/DL (ref 0–1)
WBC #/AREA URNS HPF: NORMAL /HPF
WBC OTHER # BLD: 8.2 K/UL (ref 4.5–11.5)

## 2024-02-01 PROCEDURE — 74177 CT ABD & PELVIS W/CONTRAST: CPT

## 2024-02-01 PROCEDURE — 85025 COMPLETE CBC W/AUTO DIFF WBC: CPT

## 2024-02-01 PROCEDURE — 83690 ASSAY OF LIPASE: CPT

## 2024-02-01 PROCEDURE — 93005 ELECTROCARDIOGRAM TRACING: CPT | Performed by: EMERGENCY MEDICINE

## 2024-02-01 PROCEDURE — 6360000004 HC RX CONTRAST MEDICATION: Performed by: RADIOLOGY

## 2024-02-01 PROCEDURE — 99285 EMERGENCY DEPT VISIT HI MDM: CPT

## 2024-02-01 PROCEDURE — 36415 COLL VENOUS BLD VENIPUNCTURE: CPT

## 2024-02-01 PROCEDURE — 83605 ASSAY OF LACTIC ACID: CPT

## 2024-02-01 PROCEDURE — 82248 BILIRUBIN DIRECT: CPT

## 2024-02-01 PROCEDURE — 81001 URINALYSIS AUTO W/SCOPE: CPT

## 2024-02-01 PROCEDURE — 83735 ASSAY OF MAGNESIUM: CPT

## 2024-02-01 PROCEDURE — 6360000002 HC RX W HCPCS: Performed by: EMERGENCY MEDICINE

## 2024-02-01 PROCEDURE — 80053 COMPREHEN METABOLIC PANEL: CPT

## 2024-02-01 PROCEDURE — 96375 TX/PRO/DX INJ NEW DRUG ADDON: CPT

## 2024-02-01 PROCEDURE — 96374 THER/PROPH/DIAG INJ IV PUSH: CPT

## 2024-02-01 PROCEDURE — 84484 ASSAY OF TROPONIN QUANT: CPT

## 2024-02-01 RX ORDER — MORPHINE SULFATE 5 MG/ML
5 INJECTION, SOLUTION INTRAMUSCULAR; INTRAVENOUS ONCE
Status: DISCONTINUED | OUTPATIENT
Start: 2024-02-01 | End: 2024-02-01

## 2024-02-01 RX ORDER — ONDANSETRON 2 MG/ML
4 INJECTION INTRAMUSCULAR; INTRAVENOUS ONCE
Status: COMPLETED | OUTPATIENT
Start: 2024-02-01 | End: 2024-02-01

## 2024-02-01 RX ORDER — MORPHINE SULFATE 4 MG/ML
3 INJECTION, SOLUTION INTRAMUSCULAR; INTRAVENOUS ONCE
Status: COMPLETED | OUTPATIENT
Start: 2024-02-01 | End: 2024-02-01

## 2024-02-01 RX ORDER — KETOROLAC TROMETHAMINE 15 MG/ML
15 INJECTION, SOLUTION INTRAMUSCULAR; INTRAVENOUS ONCE
Status: COMPLETED | OUTPATIENT
Start: 2024-02-01 | End: 2024-02-01

## 2024-02-01 RX ORDER — ONDANSETRON 4 MG/1
4 TABLET, FILM COATED ORAL
Qty: 10 TABLET | Refills: 0 | Status: SHIPPED | OUTPATIENT
Start: 2024-02-01

## 2024-02-01 RX ADMIN — KETOROLAC TROMETHAMINE 15 MG: 15 INJECTION, SOLUTION INTRAMUSCULAR; INTRAVENOUS at 17:02

## 2024-02-01 RX ADMIN — MORPHINE SULFATE 3 MG: 4 INJECTION, SOLUTION INTRAMUSCULAR; INTRAVENOUS at 19:20

## 2024-02-01 RX ADMIN — IOPAMIDOL 70 ML: 755 INJECTION, SOLUTION INTRAVENOUS at 18:32

## 2024-02-01 RX ADMIN — ONDANSETRON 4 MG: 2 INJECTION INTRAMUSCULAR; INTRAVENOUS at 17:00

## 2024-02-01 ASSESSMENT — ENCOUNTER SYMPTOMS
ABDOMINAL DISTENTION: 0
VOMITING: 0
CHEST TIGHTNESS: 0
NAUSEA: 1
BACK PAIN: 0
COUGH: 0
ABDOMINAL PAIN: 1
WHEEZING: 0
SHORTNESS OF BREATH: 0
SORE THROAT: 0
DIARRHEA: 0

## 2024-02-01 ASSESSMENT — PAIN SCALES - GENERAL
PAINLEVEL_OUTOF10: 9
PAINLEVEL_OUTOF10: 9
PAINLEVEL_OUTOF10: 8

## 2024-02-01 ASSESSMENT — PAIN DESCRIPTION - LOCATION
LOCATION: ABDOMEN;BACK;HIP
LOCATION: BACK;ABDOMEN;HIP
LOCATION: ABDOMEN;BACK;HIP

## 2024-02-01 ASSESSMENT — LIFESTYLE VARIABLES
HOW OFTEN DO YOU HAVE A DRINK CONTAINING ALCOHOL: NEVER
HOW MANY STANDARD DRINKS CONTAINING ALCOHOL DO YOU HAVE ON A TYPICAL DAY: PATIENT DOES NOT DRINK

## 2024-02-01 ASSESSMENT — PAIN DESCRIPTION - ORIENTATION
ORIENTATION: RIGHT
ORIENTATION: RIGHT

## 2024-02-01 ASSESSMENT — PAIN DESCRIPTION - DESCRIPTORS
DESCRIPTORS: ACHING;DULL
DESCRIPTORS: THROBBING

## 2024-02-01 ASSESSMENT — PAIN DESCRIPTION - PAIN TYPE: TYPE: ACUTE PAIN

## 2024-02-01 NOTE — ED PROVIDER NOTES
AST 19 0 - 31 U/L    Total Bilirubin 0.5 0.0 - 1.2 mg/dL    Bilirubin, Direct <0.2 0.0 - 0.3 mg/dL    Bilirubin, Indirect Can not be calculated 0.0 - 1.0 mg/dL    Total Protein 7.3 6.4 - 8.3 g/dL   Lipase   Result Value Ref Range    Lipase 28 13 - 60 U/L   Troponin   Result Value Ref Range    Troponin, High Sensitivity 19 (H) 0 - 9 ng/L   Urinalysis   Result Value Ref Range    Color, UA Yellow Yellow    Turbidity UA Clear Clear    Glucose, Ur NEGATIVE NEGATIVE mg/dL    Bilirubin Urine NEGATIVE NEGATIVE    Ketones, Urine 15 (A) NEGATIVE mg/dL    Specific Gravity, UA 1.015 1.005 - 1.030    Urine Hgb NEGATIVE NEGATIVE    pH, UA 6.0 5.0 - 9.0    Protein, UA NEGATIVE NEGATIVE mg/dL    Urobilinogen, Urine 1.0 0.0 - 1.0 EU/dL    Nitrite, Urine NEGATIVE NEGATIVE    Leukocyte Esterase, Urine SMALL (A) NEGATIVE   Microscopic Urinalysis   Result Value Ref Range    WBC, UA 0 TO 5 0 TO 5 /HPF    RBC, UA 0 TO 2 0 TO 2 /HPF    Epithelial Cells UA 3 to 5 /HPF    Mucus, UA PRESENT    Troponin   Result Value Ref Range    Troponin, High Sensitivity 17 (H) 0 - 9 ng/L   EKG 12 Lead   Result Value Ref Range    Ventricular Rate 58 BPM    Atrial Rate 58 BPM    P-R Interval 146 ms    QRS Duration 80 ms    Q-T Interval 420 ms    QTc Calculation (Bazett) 412 ms    P Axis 46 degrees    R Axis 28 degrees    T Axis 24 degrees       Radiology:  CT ABDOMEN PELVIS W IV CONTRAST Additional Contrast? None   Final Result   1. No acute intra-abdominal abnormality.   2. Severe distal colonic diverticulosis without diverticulitis.             ------------------------- NURSING NOTES AND VITALS REVIEWED ---------------------------  Date / Time Roomed:  2/1/2024  4:10 PM  ED Bed Assignment:  05/05    The nursing notes within the ED encounter and vital signs as below have been reviewed.   BP (!) 171/75   Pulse 63   Temp 98 °F (36.7 °C) (Oral)   Resp 16   Ht 1.651 m (5' 5\")   Wt 113.4 kg (250 lb)   SpO2 98%   BMI 41.60 kg/m²   Oxygen Saturation

## 2024-02-02 ENCOUNTER — PATIENT OUTREACH (OUTPATIENT)
Dept: CARE COORDINATION | Facility: CLINIC | Age: 74
End: 2024-02-02
Payer: MEDICARE

## 2024-02-02 LAB
EKG ATRIAL RATE: 58 BPM
EKG P AXIS: 46 DEGREES
EKG P-R INTERVAL: 146 MS
EKG Q-T INTERVAL: 420 MS
EKG QRS DURATION: 80 MS
EKG QTC CALCULATION (BAZETT): 412 MS
EKG R AXIS: 28 DEGREES
EKG T AXIS: 24 DEGREES
EKG VENTRICULAR RATE: 58 BPM

## 2024-02-02 PROCEDURE — 93010 ELECTROCARDIOGRAM REPORT: CPT | Performed by: INTERNAL MEDICINE

## 2024-02-02 NOTE — PROGRESS NOTES
[facility]  ED visit 2/1/2024  C/O: abdominal pain, nausea with emesis this morning, right flank pain   Dx: Generalized Abdominal Pain    Outreach call to patient to support a smooth transition of care from recent admission.  Patient states, she is getting better. Patient states, the abdominal pain is better. Patient denies any side effects of the zofran. Patient endorsed that she still has some back. Patient states, she is at an appointment. No additional needs at this time. CM will close case.    Engagement  Call Start Time: 1317 (2/2/2024  1:17 PM)    Medications  Medications reviewed with patient/caregiver?: Yes (2/2/2024  1:17 PM)  Is the patient having any side effects they believe may be caused by any medication additions or changes?: No (2/2/2024  1:17 PM)  Does the patient have all medications ordered at discharge?: Yes (2/2/2024  1:17 PM)  Care Management Interventions: No intervention needed (2/2/2024  1:17 PM)  Is the patient taking all medications as directed (includes completed medication regime)?: Yes (2/2/2024  1:17 PM)    Appointments  Does the patient have a primary care provider?: Yes (no appointment scheduled at this time) (2/2/2024  1:17 PM)    Self Management  What is the home health agency?: not applicable (2/2/2024  1:17 PM)  What Durable Medical Equipment (DME) was ordered?: not applicable (2/2/2024  1:17 PM)    Patient Teaching  Does the patient have access to their discharge instructions?: No (Patient is at an appointment) (2/2/2024  1:17 PM)  What is the patient's perception of their health status since discharge?: Improving (2/2/2024  1:17 PM)  Is the patient/caregiver able to teach back the hierarchy of who to call/visit for symptoms/problems? PCP, Specialist, Home Health nurse, Urgent Care, ED, 911: Yes (2/2/2024  1:17 PM)      Mary Jo Anaya RN/CM  [facility]  [phone]

## 2024-02-03 ENCOUNTER — OFFICE VISIT (OUTPATIENT)
Dept: PRIMARY CARE | Facility: CLINIC | Age: 74
End: 2024-02-03
Payer: MEDICARE

## 2024-02-03 VITALS
WEIGHT: 250 LBS | SYSTOLIC BLOOD PRESSURE: 162 MMHG | DIASTOLIC BLOOD PRESSURE: 87 MMHG | BODY MASS INDEX: 44.3 KG/M2 | HEIGHT: 63 IN | OXYGEN SATURATION: 98 % | HEART RATE: 67 BPM

## 2024-02-03 DIAGNOSIS — M54.50 ACUTE RIGHT-SIDED LOW BACK PAIN WITHOUT SCIATICA: Primary | ICD-10-CM

## 2024-02-03 DIAGNOSIS — M25.551 RIGHT HIP PAIN: ICD-10-CM

## 2024-02-03 PROCEDURE — 1160F RVW MEDS BY RX/DR IN RCRD: CPT | Performed by: FAMILY MEDICINE

## 2024-02-03 PROCEDURE — 1159F MED LIST DOCD IN RCRD: CPT | Performed by: FAMILY MEDICINE

## 2024-02-03 PROCEDURE — 3077F SYST BP >= 140 MM HG: CPT | Performed by: FAMILY MEDICINE

## 2024-02-03 PROCEDURE — 1036F TOBACCO NON-USER: CPT | Performed by: FAMILY MEDICINE

## 2024-02-03 PROCEDURE — 99213 OFFICE O/P EST LOW 20 MIN: CPT | Performed by: FAMILY MEDICINE

## 2024-02-03 PROCEDURE — 3079F DIAST BP 80-89 MM HG: CPT | Performed by: FAMILY MEDICINE

## 2024-02-03 RX ORDER — ZINC SULFATE 50(220)MG
50 CAPSULE ORAL DAILY
COMMUNITY

## 2024-02-03 RX ORDER — ONDANSETRON 4 MG/1
1 TABLET, FILM COATED ORAL EVERY 8 HOURS PRN
COMMUNITY
Start: 2024-02-01

## 2024-02-03 RX ORDER — PREDNISONE 10 MG/1
TABLET ORAL
Qty: 30 TABLET | Refills: 0 | Status: SHIPPED | OUTPATIENT
Start: 2024-02-03 | End: 2024-02-13

## 2024-02-03 RX ORDER — BISMUTH SUBSALICYLATE 262 MG/1
262 TABLET ORAL
COMMUNITY
Start: 2021-05-14

## 2024-02-03 NOTE — PROGRESS NOTES
Subjective   Patient ID: Jenae Delvalle is a 73 y.o. female who presents for Hip Pain (Right hip pain ).  Having right hip pain  Was hauling up eco bricks from the steps  Dull, aching  Pain in back  Worse- moving  Better- rest, morphine helped but vomited in ER  No numbness, weakness  Has seen 2 chiropractor  Pain goes up into right abdomen  Had labs, CT A/P which were good  No numbness, weakness    Current Outpatient Medications:     bismuth subsalicylate (Pepto Bismol) 262 mg chewable tablet, Chew 1 tablet (262 mg) 4 times a day before meals., Disp: , Rfl:     ondansetron (Zofran) 4 mg tablet, Take 1 tablet (4 mg) by mouth every 8 hours if needed for nausea., Disp: , Rfl:     cholecalciferol (Vitamin D-3) 25 MCG (1000 UT) tablet, Take 1 tablet (1,000 Units) by mouth once daily., Disp: , Rfl:     cimetidine (Tagamet) 200 mg tablet, Take by mouth., Disp: , Rfl:     hf-ss-TU-vit P-qosqm-dipp-zeax (Ocuvite Eye Plus Multi) 200- mcg tablet, Take by mouth., Disp: , Rfl:     predniSONE (Deltasone) 10 mg tablet, Take 5 tablets (50 mg) by mouth once daily for 2 days, THEN 4 tablets (40 mg) once daily for 2 days, THEN 3 tablets (30 mg) once daily for 2 days, THEN 2 tablets (20 mg) once daily for 2 days, THEN 1 tablet (10 mg) once daily for 2 days., Disp: 30 tablet, Rfl: 0    triamterene-hydrochlorothiazid (Dyazide) 37.5-25 mg capsule, TAKE ONE CAPSULE BY MOUTH EVERY DAY, Disp: 90 capsule, Rfl: 3    zinc sulfate (Zincate) 220 (50 Zn) MG capsule, Take 1 capsule (50 mg of elemental zinc) by mouth once daily., Disp: , Rfl:    Past Surgical History:   Procedure Laterality Date    CHOLECYSTECTOMY  03/21/2014    Cholecystectomy    COLONOSCOPY  11/14/2014    Colonoscopy (Fiberoptic)    ESOPHAGOGASTRODUODENOSCOPY  11/14/2014    Diagnostic Esophagogastroduodenoscopy    FOOT SURGERY  09/18/2018    Foot Surgery    OTHER SURGICAL HISTORY  03/21/2014    Ostectomy Calcaneus For Spur    OTHER SURGICAL HISTORY  09/19/2019    Split  "thickness skin graft    TUBAL LIGATION  03/21/2014    Tubal Ligation      Past Medical History:   Diagnosis Date    Acute candidiasis of vulva and vagina 03/24/2015    Yeast vaginitis    Acute maxillary sinusitis, unspecified 11/29/2017    Acute non-recurrent maxillary sinusitis    Chronic rhinitis 06/06/2013    Rhinitis    COVID-19 04/09/2021    COVID-19 virus infection    Other conditions influencing health status 06/25/2013    Mammogram    Other conditions influencing health status 06/25/2013    Leiomyomatous Degeneration Of The Uterus    Personal history of neoplasm of uncertain behavior 03/21/2014    History of neoplasm of uncertain behavior    Personal history of other diseases of the digestive system 10/17/2014    History of acute gastritis    Personal history of other diseases of the respiratory system 11/29/2017    History of bronchitis    Personal history of other endocrine, nutritional and metabolic disease 03/21/2014    History of obesity    Personal history of other infectious and parasitic diseases 03/27/2015    History of athlete's foot    Personal history of other malignant neoplasm of skin     History of skin cancer    Personal history of other malignant neoplasm of skin     History of malignant neoplasm of skin    Personal history of other specified conditions 12/11/2017    History of dysuria    Personal history of urinary (tract) infections 12/22/2017    History of urinary tract infection     Social History     Tobacco Use    Smoking status: Never    Smokeless tobacco: Never   Substance Use Topics    Alcohol use: Never    Drug use: Never      No family history on file.   Review of Systems    Objective   /87   Pulse 67   Ht 1.6 m (5' 3\")   Wt 113 kg (250 lb)   SpO2 98%   BMI 44.29 kg/m²    Physical Exam  Vitals and nursing note reviewed.   Constitutional:       General: She is not in acute distress.     Appearance: Normal appearance. She is not ill-appearing.   Cardiovascular:      Rate " and Rhythm: Normal rate and regular rhythm.      Pulses: Normal pulses.      Heart sounds: Normal heart sounds.   Pulmonary:      Effort: Pulmonary effort is normal.      Breath sounds: Normal breath sounds.   Musculoskeletal:      Comments: TTP along right side of lumbars and posterior hip area  Increased muscle tone   Skin:     Capillary Refill: Capillary refill takes less than 2 seconds.   Neurological:      General: No focal deficit present.      Mental Status: She is alert and oriented to person, place, and time.      Sensory: No sensory deficit.      Motor: No weakness.   Psychiatric:         Mood and Affect: Mood normal.         Behavior: Behavior normal.         Assessment/Plan   Problem List Items Addressed This Visit    None  Visit Diagnoses       Acute right-sided low back pain without sciatica    -  Primary    Relevant Medications    predniSONE (Deltasone) 10 mg tablet    Other Relevant Orders    XR lumbar spine complete 4+ views    Right hip pain        Relevant Medications    predniSONE (Deltasone) 10 mg tablet    Other Relevant Orders    XR hip right with pelvis when performed 2 or 3 views        Heat 20 minutes tid    Patient understands and agrees with treatment plan    Mathew Rousseau, DO

## 2024-02-05 ENCOUNTER — HOSPITAL ENCOUNTER (OUTPATIENT)
Dept: RADIOLOGY | Facility: CLINIC | Age: 74
Discharge: HOME | End: 2024-02-05
Payer: MEDICARE

## 2024-02-05 DIAGNOSIS — M25.551 RIGHT HIP PAIN: ICD-10-CM

## 2024-02-05 DIAGNOSIS — M54.50 ACUTE RIGHT-SIDED LOW BACK PAIN WITHOUT SCIATICA: ICD-10-CM

## 2024-02-05 PROCEDURE — 72110 X-RAY EXAM L-2 SPINE 4/>VWS: CPT

## 2024-02-05 PROCEDURE — 73502 X-RAY EXAM HIP UNI 2-3 VIEWS: CPT | Mod: RT

## 2024-02-05 PROCEDURE — 73502 X-RAY EXAM HIP UNI 2-3 VIEWS: CPT | Mod: RIGHT SIDE | Performed by: STUDENT IN AN ORGANIZED HEALTH CARE EDUCATION/TRAINING PROGRAM

## 2024-02-07 ENCOUNTER — OFFICE VISIT (OUTPATIENT)
Dept: PRIMARY CARE | Facility: CLINIC | Age: 74
End: 2024-02-07
Payer: MEDICARE

## 2024-02-07 VITALS — OXYGEN SATURATION: 97 % | HEART RATE: 58 BPM

## 2024-02-07 DIAGNOSIS — B02.9 HERPES ZOSTER WITHOUT COMPLICATION: Primary | ICD-10-CM

## 2024-02-07 DIAGNOSIS — E66.01 CLASS 3 SEVERE OBESITY DUE TO EXCESS CALORIES WITH SERIOUS COMORBIDITY AND BODY MASS INDEX (BMI) OF 40.0 TO 44.9 IN ADULT (MULTI): ICD-10-CM

## 2024-02-07 PROCEDURE — 1159F MED LIST DOCD IN RCRD: CPT | Performed by: FAMILY MEDICINE

## 2024-02-07 PROCEDURE — 1160F RVW MEDS BY RX/DR IN RCRD: CPT | Performed by: FAMILY MEDICINE

## 2024-02-07 PROCEDURE — 3008F BODY MASS INDEX DOCD: CPT | Performed by: FAMILY MEDICINE

## 2024-02-07 PROCEDURE — 99213 OFFICE O/P EST LOW 20 MIN: CPT | Performed by: FAMILY MEDICINE

## 2024-02-07 PROCEDURE — 1036F TOBACCO NON-USER: CPT | Performed by: FAMILY MEDICINE

## 2024-02-07 RX ORDER — GABAPENTIN 100 MG/1
100 CAPSULE ORAL 3 TIMES DAILY
Qty: 90 CAPSULE | Refills: 2 | Status: SHIPPED | OUTPATIENT
Start: 2024-02-07 | End: 2024-05-07

## 2024-02-07 RX ORDER — VALACYCLOVIR HYDROCHLORIDE 1 G/1
1000 TABLET, FILM COATED ORAL 3 TIMES DAILY
Qty: 21 TABLET | Refills: 0 | Status: SHIPPED | OUTPATIENT
Start: 2024-02-07 | End: 2024-02-14

## 2024-02-07 ASSESSMENT — PATIENT HEALTH QUESTIONNAIRE - PHQ9
1. LITTLE INTEREST OR PLEASURE IN DOING THINGS: NOT AT ALL
2. FEELING DOWN, DEPRESSED OR HOPELESS: NOT AT ALL
SUM OF ALL RESPONSES TO PHQ9 QUESTIONS 1 AND 2: 0

## 2024-02-07 NOTE — PROGRESS NOTES
Subjective   Patient ID: Jenae Delvalle is a 73 y.o. female who presents for Rash (Pt thinks she may have shingles by pelvic area ).  HPI  Pleasant 73-year-old  female presents today with a concern for a rash on her right flank.  Onset 1 day ago.  The patient currently on prednisone for right hip pain.  States that she started with right hip and flank pain was seen in the office by my partner diagnosed with hip arthritis and started on prednisone.  Then last night started with a rash on her stomach and this morning noticed a few lesions in a linear fashion on her back.  Has been using over-the-counter medicine for pain  Review of Systems   Constitutional:  Negative for appetite change, fever and unexpected weight change.   HENT:  Negative for congestion and trouble swallowing.    Eyes:  Negative for visual disturbance.   Respiratory:  Negative for shortness of breath.    Cardiovascular:  Negative for chest pain, palpitations and leg swelling.   Gastrointestinal:  Negative for blood in stool, constipation and diarrhea.   Genitourinary:  Negative for difficulty urinating and hematuria.   Musculoskeletal:  Negative for gait problem and joint swelling.   Skin:  Positive for rash. Negative for color change.   Allergic/Immunologic: Negative for immunocompromised state.   Neurological:  Negative for seizures and syncope.   Psychiatric/Behavioral:  Negative for confusion and suicidal ideas. The patient is not nervous/anxious.        Objective   Pulse 58   SpO2 97%     Physical Exam  Constitutional:       General: She is not in acute distress.     Appearance: Normal appearance. She is not ill-appearing.   HENT:      Head: Normocephalic and atraumatic.      Right Ear: Tympanic membrane normal.      Left Ear: Tympanic membrane normal.      Nose: Nose normal.      Mouth/Throat:      Mouth: Mucous membranes are moist.   Eyes:      Pupils: Pupils are equal, round, and reactive to light.   Cardiovascular:      Rate and  Rhythm: Normal rate and regular rhythm.      Heart sounds: No murmur heard.     No friction rub. No gallop.   Pulmonary:      Effort: Pulmonary effort is normal.      Breath sounds: Normal breath sounds.   Abdominal:      General: Abdomen is flat. There is no distension.      Palpations: Abdomen is soft.      Tenderness: There is no abdominal tenderness. There is no guarding.      Hernia: No hernia is present.   Musculoskeletal:         General: Normal range of motion.   Skin:     General: Skin is warm and dry.      Findings: Lesion (Right flank has gross vesicles w/ erythematous base) present.   Neurological:      General: No focal deficit present.      Mental Status: She is alert. Mental status is at baseline.      Cranial Nerves: No cranial nerve deficit.      Motor: No weakness.      Gait: Gait normal.   Psychiatric:         Mood and Affect: Mood normal.         Behavior: Behavior normal.         Thought Content: Thought content normal.         Judgment: Judgment normal.         Assessment/Plan   Problem List Items Addressed This Visit    None  Visit Diagnoses       Herpes zoster without complication    -  Primary    Relevant Medications    valACYclovir (Valtrex) 1 gram tablet    gabapentin (Neurontin) 100 mg capsule          #Shingles:  - Continue prednisone  - Start Valtrex  - Gabapentin written

## 2024-02-11 ASSESSMENT — ENCOUNTER SYMPTOMS
CONFUSION: 0
HEMATURIA: 0
UNEXPECTED WEIGHT CHANGE: 0
CONSTIPATION: 0
NERVOUS/ANXIOUS: 0
FEVER: 0
PALPITATIONS: 0
SHORTNESS OF BREATH: 0
DIARRHEA: 0
TROUBLE SWALLOWING: 0
BLOOD IN STOOL: 0
COLOR CHANGE: 0
JOINT SWELLING: 0
APPETITE CHANGE: 0
SEIZURES: 0
DIFFICULTY URINATING: 0

## 2024-06-20 ENCOUNTER — APPOINTMENT (OUTPATIENT)
Dept: PRIMARY CARE | Facility: CLINIC | Age: 74
End: 2024-06-20
Payer: MEDICARE

## 2024-06-20 VITALS
SYSTOLIC BLOOD PRESSURE: 128 MMHG | HEIGHT: 65 IN | OXYGEN SATURATION: 96 % | HEART RATE: 58 BPM | BODY MASS INDEX: 41.65 KG/M2 | DIASTOLIC BLOOD PRESSURE: 74 MMHG | WEIGHT: 250 LBS

## 2024-06-20 DIAGNOSIS — N18.31 STAGE 3A CHRONIC KIDNEY DISEASE (MULTI): ICD-10-CM

## 2024-06-20 DIAGNOSIS — Z13.1 DIABETES MELLITUS SCREENING: ICD-10-CM

## 2024-06-20 DIAGNOSIS — Z78.0 MENOPAUSE: ICD-10-CM

## 2024-06-20 DIAGNOSIS — E66.01 CLASS 3 SEVERE OBESITY DUE TO EXCESS CALORIES WITH SERIOUS COMORBIDITY AND BODY MASS INDEX (BMI) OF 40.0 TO 44.9 IN ADULT (MULTI): ICD-10-CM

## 2024-06-20 DIAGNOSIS — Z00.00 MEDICARE ANNUAL WELLNESS VISIT, SUBSEQUENT: Primary | ICD-10-CM

## 2024-06-20 DIAGNOSIS — H35.3211 EXUDATIVE AGE-RELATED MACULAR DEGENERATION OF RIGHT EYE WITH ACTIVE CHOROIDAL NEOVASCULARIZATION (MULTI): ICD-10-CM

## 2024-06-20 DIAGNOSIS — I10 PRIMARY HYPERTENSION: ICD-10-CM

## 2024-06-20 DIAGNOSIS — Z13.0 SCREENING FOR DEFICIENCY ANEMIA: ICD-10-CM

## 2024-06-20 DIAGNOSIS — Z12.31 SCREENING MAMMOGRAM, ENCOUNTER FOR: ICD-10-CM

## 2024-06-20 DIAGNOSIS — R00.1 BRADYCARDIA: ICD-10-CM

## 2024-06-20 DIAGNOSIS — Z13.220 LIPID SCREENING: ICD-10-CM

## 2024-06-20 DIAGNOSIS — Z00.00 ROUTINE GENERAL MEDICAL EXAMINATION AT A HEALTH CARE FACILITY: ICD-10-CM

## 2024-06-20 DIAGNOSIS — H35.3230 BILATERAL EXUDATIVE AGE-RELATED MACULAR DEGENERATION, UNSPECIFIED STAGE (MULTI): ICD-10-CM

## 2024-06-20 LAB
ALBUMIN SERPL BCP-MCNC: 4.2 G/DL (ref 3.4–5)
ALP SERPL-CCNC: 116 U/L (ref 33–136)
ALT SERPL W P-5'-P-CCNC: 20 U/L (ref 7–45)
ANION GAP SERPL CALC-SCNC: 14 MMOL/L (ref 10–20)
AST SERPL W P-5'-P-CCNC: 21 U/L (ref 9–39)
BILIRUB SERPL-MCNC: 0.8 MG/DL (ref 0–1.2)
BUN SERPL-MCNC: 23 MG/DL (ref 6–23)
CALCIUM SERPL-MCNC: 9.1 MG/DL (ref 8.6–10.3)
CHLORIDE SERPL-SCNC: 100 MMOL/L (ref 98–107)
CHOLEST SERPL-MCNC: 209 MG/DL (ref 0–199)
CHOLESTEROL/HDL RATIO: 5.1
CO2 SERPL-SCNC: 28 MMOL/L (ref 21–32)
CREAT SERPL-MCNC: 1.05 MG/DL (ref 0.5–1.05)
EGFRCR SERPLBLD CKD-EPI 2021: 57 ML/MIN/1.73M*2
ERYTHROCYTE [DISTWIDTH] IN BLOOD BY AUTOMATED COUNT: 14.7 % (ref 11.5–14.5)
GLUCOSE SERPL-MCNC: 83 MG/DL (ref 74–99)
HCT VFR BLD AUTO: 43.9 % (ref 36–46)
HDLC SERPL-MCNC: 41 MG/DL
HGB BLD-MCNC: 13.5 G/DL (ref 12–16)
LDLC SERPL CALC-MCNC: 137 MG/DL
MCH RBC QN AUTO: 25.3 PG (ref 26–34)
MCHC RBC AUTO-ENTMCNC: 30.8 G/DL (ref 32–36)
MCV RBC AUTO: 82 FL (ref 80–100)
NON HDL CHOLESTEROL: 168 MG/DL (ref 0–149)
NRBC BLD-RTO: 0 /100 WBCS (ref 0–0)
PLATELET # BLD AUTO: 284 X10*3/UL (ref 150–450)
POTASSIUM SERPL-SCNC: 4 MMOL/L (ref 3.5–5.3)
PROT SERPL-MCNC: 6.9 G/DL (ref 6.4–8.2)
RBC # BLD AUTO: 5.34 X10*6/UL (ref 4–5.2)
SODIUM SERPL-SCNC: 138 MMOL/L (ref 136–145)
TRIGL SERPL-MCNC: 153 MG/DL (ref 0–149)
TSH SERPL-ACNC: 2.13 MIU/L (ref 0.44–3.98)
VLDL: 31 MG/DL (ref 0–40)
WBC # BLD AUTO: 6.2 X10*3/UL (ref 4.4–11.3)

## 2024-06-20 PROCEDURE — 1170F FXNL STATUS ASSESSED: CPT | Performed by: FAMILY MEDICINE

## 2024-06-20 PROCEDURE — 80061 LIPID PANEL: CPT

## 2024-06-20 PROCEDURE — 36415 COLL VENOUS BLD VENIPUNCTURE: CPT

## 2024-06-20 PROCEDURE — 3008F BODY MASS INDEX DOCD: CPT | Performed by: FAMILY MEDICINE

## 2024-06-20 PROCEDURE — 85027 COMPLETE CBC AUTOMATED: CPT

## 2024-06-20 PROCEDURE — 1124F ACP DISCUSS-NO DSCNMKR DOCD: CPT | Performed by: FAMILY MEDICINE

## 2024-06-20 PROCEDURE — G0439 PPPS, SUBSEQ VISIT: HCPCS | Performed by: FAMILY MEDICINE

## 2024-06-20 PROCEDURE — 84443 ASSAY THYROID STIM HORMONE: CPT

## 2024-06-20 PROCEDURE — 99397 PER PM REEVAL EST PAT 65+ YR: CPT | Performed by: FAMILY MEDICINE

## 2024-06-20 PROCEDURE — 3078F DIAST BP <80 MM HG: CPT | Performed by: FAMILY MEDICINE

## 2024-06-20 PROCEDURE — 80053 COMPREHEN METABOLIC PANEL: CPT

## 2024-06-20 PROCEDURE — 1159F MED LIST DOCD IN RCRD: CPT | Performed by: FAMILY MEDICINE

## 2024-06-20 PROCEDURE — 3074F SYST BP LT 130 MM HG: CPT | Performed by: FAMILY MEDICINE

## 2024-06-20 RX ORDER — TRIAMTERENE AND HYDROCHLOROTHIAZIDE 37.5; 25 MG/1; MG/1
CAPSULE ORAL
Qty: 90 CAPSULE | Refills: 3 | Status: SHIPPED | OUTPATIENT
Start: 2024-06-20

## 2024-06-20 ASSESSMENT — ENCOUNTER SYMPTOMS
DIARRHEA: 0
COLOR CHANGE: 0
CONFUSION: 0
HEMATURIA: 0
SHORTNESS OF BREATH: 0
APPETITE CHANGE: 0
SEIZURES: 0
BLOOD IN STOOL: 0
DIFFICULTY URINATING: 0
CONSTIPATION: 0
TROUBLE SWALLOWING: 0
NERVOUS/ANXIOUS: 0
JOINT SWELLING: 0
PALPITATIONS: 0
UNEXPECTED WEIGHT CHANGE: 0
FEVER: 0

## 2024-06-20 ASSESSMENT — ACTIVITIES OF DAILY LIVING (ADL)
DRESSING: INDEPENDENT
BATHING: INDEPENDENT
DOING_HOUSEWORK: INDEPENDENT
TAKING_MEDICATION: INDEPENDENT
MANAGING_FINANCES: INDEPENDENT
GROCERY_SHOPPING: INDEPENDENT

## 2024-06-20 ASSESSMENT — PATIENT HEALTH QUESTIONNAIRE - PHQ9
2. FEELING DOWN, DEPRESSED OR HOPELESS: NOT AT ALL
SUM OF ALL RESPONSES TO PHQ9 QUESTIONS 1 AND 2: 0
1. LITTLE INTEREST OR PLEASURE IN DOING THINGS: NOT AT ALL

## 2024-06-20 NOTE — PROGRESS NOTES
"Subjective   Reason for Visit: Jenae Delvalle is an 74 y.o. female here for a Medicare Wellness visit.          Reviewed all medications by prescribing practitioner or clinical pharmacist (such as prescriptions, OTCs, herbal therapies and supplements) and documented in the medical record.    HPI  Hypertension:  -controlled w/ triamterene-hydrochlorothiazide    Patient Care Team:  Lei LAWRENCE DO as PCP - General (Family Medicine)  Jordan Mcneal MD as Referring Physician ([facility])     Review of Systems   Constitutional:  Negative for appetite change, fever and unexpected weight change.   HENT:  Negative for congestion and trouble swallowing.    Eyes:  Negative for visual disturbance.   Respiratory:  Negative for shortness of breath.    Cardiovascular:  Negative for chest pain, palpitations and leg swelling.   Gastrointestinal:  Negative for blood in stool, constipation and diarrhea.   Genitourinary:  Negative for difficulty urinating and hematuria.   Musculoskeletal:  Negative for gait problem and joint swelling.   Skin:  Negative for color change.   Allergic/Immunologic: Negative for immunocompromised state.   Neurological:  Negative for seizures and syncope.   Psychiatric/Behavioral:  Negative for confusion and suicidal ideas. The patient is not nervous/anxious.        Objective   Vitals:  /74   Pulse 58   Ht 1.651 m (5' 5\")   Wt 113 kg (250 lb)   SpO2 96%   BMI 41.60 kg/m²       Physical Exam  Constitutional:       General: She is not in acute distress.     Appearance: Normal appearance. She is not ill-appearing.   HENT:      Head: Normocephalic and atraumatic.      Right Ear: Tympanic membrane normal.      Left Ear: Tympanic membrane normal.      Nose: Nose normal.      Mouth/Throat:      Mouth: Mucous membranes are moist.   Eyes:      Pupils: Pupils are equal, round, and reactive to light.   Cardiovascular:      Rate and Rhythm: Normal rate and regular rhythm.      Heart " sounds: No murmur heard.     No friction rub. No gallop.   Pulmonary:      Effort: Pulmonary effort is normal.      Breath sounds: Normal breath sounds.   Abdominal:      General: Abdomen is flat. There is no distension.      Palpations: Abdomen is soft.      Tenderness: There is no abdominal tenderness. There is no guarding.      Hernia: No hernia is present.   Musculoskeletal:         General: Normal range of motion.   Skin:     General: Skin is warm and dry.   Neurological:      General: No focal deficit present.      Mental Status: She is alert. Mental status is at baseline.      Cranial Nerves: No cranial nerve deficit.      Motor: No weakness.      Gait: Gait normal.   Psychiatric:         Mood and Affect: Mood normal.         Behavior: Behavior normal.         Thought Content: Thought content normal.         Judgment: Judgment normal.       Assessment/Plan   Problem List Items Addressed This Visit       Hypertension    Relevant Orders    CBC    Comprehensive Metabolic Panel    TSH with reflex to Free T4 if abnormal    Exudative age-related macular degeneration of right eye with active choroidal neovascularization (Multi)     Other Visit Diagnoses       Medicare annual wellness visit, subsequent    -  Primary    Screening for deficiency anemia        Relevant Orders    CBC    Diabetes mellitus screening        Relevant Orders    Comprehensive Metabolic Panel    Lipid screening        Relevant Orders    Lipid Panel    Screening mammogram, encounter for        Relevant Orders    BI mammo bilateral screening tomosynthesis    Menopause        Relevant Orders    XR DEXA bone density          #Bradycardia:  -no symptoms    #Hypertension:  -triamterene/hydrochlorothiazide    #Obesity:  -discussed diet    #Macular degeneration:  -optho    #Left soft tissue mass: lipoma vs ganglion vs possible sarcome  -we discussed MRI    Health Maintenance Reminder:  -Medicare: 2025  -Blood Work: today  -Colonoscopy: 5/26  -mammo:  ordered  -Lung Cancer: 50-80y  -dexa: ordered   -pap: >65y  -Shingrix: refused   -Prevnar 20: wants to wait  -Flu:  Yearly

## 2024-06-21 PROBLEM — N18.31 STAGE 3A CHRONIC KIDNEY DISEASE (MULTI): Status: ACTIVE | Noted: 2024-06-21

## 2024-12-11 ENCOUNTER — APPOINTMENT (OUTPATIENT)
Dept: RADIOLOGY | Facility: HOSPITAL | Age: 74
End: 2024-12-11
Payer: MEDICARE

## 2024-12-13 ENCOUNTER — OFFICE VISIT (OUTPATIENT)
Dept: PRIMARY CARE | Facility: CLINIC | Age: 74
End: 2024-12-13
Payer: MEDICARE

## 2024-12-13 VITALS
SYSTOLIC BLOOD PRESSURE: 122 MMHG | WEIGHT: 251 LBS | DIASTOLIC BLOOD PRESSURE: 82 MMHG | BODY MASS INDEX: 41.77 KG/M2 | HEART RATE: 69 BPM | OXYGEN SATURATION: 97 %

## 2024-12-13 DIAGNOSIS — J02.9 SORE THROAT: Primary | ICD-10-CM

## 2024-12-13 DIAGNOSIS — R05.1 ACUTE COUGH: ICD-10-CM

## 2024-12-13 LAB — POC RAPID STREP: NEGATIVE

## 2024-12-13 PROCEDURE — G2211 COMPLEX E/M VISIT ADD ON: HCPCS

## 2024-12-13 PROCEDURE — 3074F SYST BP LT 130 MM HG: CPT

## 2024-12-13 PROCEDURE — 1160F RVW MEDS BY RX/DR IN RCRD: CPT

## 2024-12-13 PROCEDURE — 1159F MED LIST DOCD IN RCRD: CPT

## 2024-12-13 PROCEDURE — 87880 STREP A ASSAY W/OPTIC: CPT

## 2024-12-13 PROCEDURE — 1036F TOBACCO NON-USER: CPT

## 2024-12-13 PROCEDURE — 99213 OFFICE O/P EST LOW 20 MIN: CPT

## 2024-12-13 PROCEDURE — 3079F DIAST BP 80-89 MM HG: CPT

## 2024-12-13 RX ORDER — AZITHROMYCIN 250 MG/1
TABLET, FILM COATED ORAL
Qty: 6 TABLET | Refills: 0 | Status: SHIPPED | OUTPATIENT
Start: 2024-12-13 | End: 2024-12-18

## 2024-12-13 NOTE — PROGRESS NOTES
Subjective   Patient ID: Jenae Delvalle is a 74 y.o. female who presents for Cough (Sore throat, sinus drainage).  HPI  Jenae presents for cough, sore throat, sinus drainage   She started to not feel well Wednesday night during the night (2 nights ago), woke up coughing  Yesterday morning was hoarse from coughing   She also wonders if she has acid reflux  She states she made a soup, ate boiled potatoes, stomach did not feel too bad   She states she was coughing last night during the night, cough seems to be worse at night   Throat is sore, hurts to swallow   Has been dirnking hot tea and orange juice  Maybe coughs up some mucous, it is clear   When she talks on the phone she coughs  Runny nose   No sinus pressure   Some drainage down her throat   Denies SOB  Feels a little achey   Got a new furance Wednesday afternoon so also wondering if that stirred things around in her air      Past Surgical History:   Procedure Laterality Date   • CHOLECYSTECTOMY  03/21/2014    Cholecystectomy   • COLONOSCOPY  11/14/2014    Colonoscopy (Fiberoptic)   • ESOPHAGOGASTRODUODENOSCOPY  11/14/2014    Diagnostic Esophagogastroduodenoscopy   • FOOT SURGERY  09/18/2018    Foot Surgery   • OTHER SURGICAL HISTORY  03/21/2014    Ostectomy Calcaneus For Spur   • OTHER SURGICAL HISTORY  09/19/2019    Split thickness skin graft   • TUBAL LIGATION  03/21/2014    Tubal Ligation      Past Medical History:   Diagnosis Date   • Acute candidiasis of vulva and vagina 03/24/2015    Yeast vaginitis   • Acute maxillary sinusitis, unspecified 11/29/2017    Acute non-recurrent maxillary sinusitis   • Chronic rhinitis 06/06/2013    Rhinitis   • COVID-19 04/09/2021    COVID-19 virus infection   • Other conditions influencing health status 06/25/2013    Mammogram   • Other conditions influencing health status 06/25/2013    Leiomyomatous Degeneration Of The Uterus   • Personal history of neoplasm of uncertain behavior 03/21/2014    History of neoplasm of  uncertain behavior   • Personal history of other diseases of the digestive system 10/17/2014    History of acute gastritis   • Personal history of other diseases of the respiratory system 11/29/2017    History of bronchitis   • Personal history of other endocrine, nutritional and metabolic disease 03/21/2014    History of obesity   • Personal history of other infectious and parasitic diseases 03/27/2015    History of athlete's foot   • Personal history of other malignant neoplasm of skin     History of skin cancer   • Personal history of other malignant neoplasm of skin     History of malignant neoplasm of skin   • Personal history of other specified conditions 12/11/2017    History of dysuria   • Personal history of urinary (tract) infections 12/22/2017    History of urinary tract infection     Social History     Tobacco Use   • Smoking status: Never   • Smokeless tobacco: Never   Substance Use Topics   • Alcohol use: Never   • Drug use: Never        Review of Systems  10 point review of systems performed and is negative except as noted in the HPI.      Current Outpatient Medications:   •  bismuth subsalicylate (Pepto Bismol) 262 mg chewable tablet, Chew 1 tablet (262 mg) 4 times a day before meals., Disp: , Rfl:   •  cholecalciferol (Vitamin D-3) 25 MCG (1000 UT) tablet, Take 1 tablet (1,000 Units) by mouth once daily., Disp: , Rfl:   •  cimetidine (Tagamet) 200 mg tablet, Take by mouth., Disp: , Rfl:   •  zm-eb-TW-vit M-mjbzr-znzh-zeax (Ocuvite Eye Plus Multi) 200- mcg tablet, Take by mouth., Disp: , Rfl:   •  ondansetron (Zofran) 4 mg tablet, Take 1 tablet (4 mg) by mouth every 8 hours if needed for nausea., Disp: , Rfl:   •  triamterene-hydrochlorothiazid (Dyazide) 37.5-25 mg capsule, TAKE ONE CAPSULE BY MOUTH EVERY DAY, Disp: 90 capsule, Rfl: 3  •  zinc sulfate (Zincate) 220 (50 Zn) MG capsule, Take 1 capsule (50 mg of elemental zinc) by mouth once daily., Disp: , Rfl:   •  azithromycin (Zithromax) 250  mg tablet, Take 2 tablets (500 mg) by mouth once daily for 1 day, THEN 1 tablet (250 mg) once daily for 4 days. Take 2 tabs (500 mg) by mouth today, than 1 daily for 4 days.., Disp: 6 tablet, Rfl: 0     Objective   /82   Pulse 69   Wt 114 kg (251 lb)   SpO2 97%   BMI 41.77 kg/m²     Physical Exam  Vitals reviewed.   Constitutional:       General: She is not in acute distress.     Appearance: Normal appearance. She is not toxic-appearing.   HENT:      Head: Normocephalic and atraumatic.      Right Ear: Tympanic membrane, ear canal and external ear normal.      Left Ear: Tympanic membrane, ear canal and external ear normal.      Nose: Rhinorrhea present.      Right Sinus: Maxillary sinus tenderness and frontal sinus tenderness present.      Left Sinus: Maxillary sinus tenderness and frontal sinus tenderness present.      Mouth/Throat:      Mouth: Mucous membranes are moist.      Pharynx: Oropharynx is clear. Posterior oropharyngeal erythema present. No oropharyngeal exudate.   Eyes:      Conjunctiva/sclera: Conjunctivae normal.      Pupils: Pupils are equal, round, and reactive to light.   Cardiovascular:      Rate and Rhythm: Normal rate and regular rhythm.      Heart sounds: Normal heart sounds.   Pulmonary:      Effort: Pulmonary effort is normal.      Breath sounds: Normal breath sounds. No wheezing, rhonchi or rales.   Lymphadenopathy:      Cervical: No cervical adenopathy.   Skin:     General: Skin is warm and dry.   Neurological:      Mental Status: She is alert and oriented to person, place, and time.       Assessment & Plan  Sore throat  Rapid strep negative  Orders:  •  POCT Rapid Strep A manually resulted    Acute cough  Discussed it still could likely be viral, to continue supportive care at this time but since we are going into the weekend she would like to have something just in case she worsens, discussed will send in zpak but I recommend she wait several days still and see if she improves  prior to starting it   Orders:  •  azithromycin (Zithromax) 250 mg tablet; Take 2 tablets (500 mg) by mouth once daily for 1 day, THEN 1 tablet (250 mg) once daily for 4 days. Take 2 tabs (500 mg) by mouth today, than 1 daily for 4 days..          Discussed at visit any disease processes that were of concern as well as the risks, benefits and instructions on any new medication provided. Patient (and/or caretaker of patient if present) stated all questions were answered, and they voiced understanding of instructions.      Alicia Bailon PA-C

## 2025-05-27 ENCOUNTER — HOSPITAL ENCOUNTER (OUTPATIENT)
Dept: RADIOLOGY | Facility: CLINIC | Age: 75
Discharge: HOME | End: 2025-05-27
Payer: MEDICARE

## 2025-05-27 ENCOUNTER — OFFICE VISIT (OUTPATIENT)
Dept: PRIMARY CARE | Facility: CLINIC | Age: 75
End: 2025-05-27
Payer: MEDICARE

## 2025-05-27 VITALS
DIASTOLIC BLOOD PRESSURE: 77 MMHG | SYSTOLIC BLOOD PRESSURE: 123 MMHG | OXYGEN SATURATION: 97 % | HEART RATE: 60 BPM | TEMPERATURE: 97.5 F

## 2025-05-27 DIAGNOSIS — M85.89 OSTEOPENIA OF MULTIPLE SITES: ICD-10-CM

## 2025-05-27 DIAGNOSIS — Z12.31 ENCOUNTER FOR SCREENING MAMMOGRAM FOR MALIGNANT NEOPLASM OF BREAST: ICD-10-CM

## 2025-05-27 DIAGNOSIS — M25.561 ACUTE PAIN OF RIGHT KNEE: ICD-10-CM

## 2025-05-27 DIAGNOSIS — R35.0 FREQUENCY OF URINATION: Primary | ICD-10-CM

## 2025-05-27 LAB
POC APPEARANCE, URINE: ABNORMAL
POC BILIRUBIN, URINE: NEGATIVE
POC BLOOD, URINE: NEGATIVE
POC COLOR, URINE: YELLOW
POC GLUCOSE, URINE: NEGATIVE MG/DL
POC KETONES, URINE: NEGATIVE MG/DL
POC LEUKOCYTES, URINE: ABNORMAL
POC NITRITE,URINE: NEGATIVE
POC PH, URINE: 7.5 PH
POC PROTEIN, URINE: NEGATIVE MG/DL
POC SPECIFIC GRAVITY, URINE: 1.02
POC UROBILINOGEN, URINE: 0.2 EU/DL

## 2025-05-27 PROCEDURE — 3074F SYST BP LT 130 MM HG: CPT

## 2025-05-27 PROCEDURE — 1036F TOBACCO NON-USER: CPT

## 2025-05-27 PROCEDURE — 1159F MED LIST DOCD IN RCRD: CPT

## 2025-05-27 PROCEDURE — 1160F RVW MEDS BY RX/DR IN RCRD: CPT

## 2025-05-27 PROCEDURE — 73564 X-RAY EXAM KNEE 4 OR MORE: CPT | Mod: RT

## 2025-05-27 PROCEDURE — 3078F DIAST BP <80 MM HG: CPT

## 2025-05-27 PROCEDURE — 73564 X-RAY EXAM KNEE 4 OR MORE: CPT | Mod: RIGHT SIDE | Performed by: RADIOLOGY

## 2025-05-27 PROCEDURE — 99213 OFFICE O/P EST LOW 20 MIN: CPT

## 2025-05-27 PROCEDURE — 81003 URINALYSIS AUTO W/O SCOPE: CPT

## 2025-05-27 RX ORDER — NITROFURANTOIN 25; 75 MG/1; MG/1
100 CAPSULE ORAL 2 TIMES DAILY
Qty: 10 CAPSULE | Refills: 0 | Status: SHIPPED | OUTPATIENT
Start: 2025-05-27 | End: 2025-06-01

## 2025-05-27 ASSESSMENT — PATIENT HEALTH QUESTIONNAIRE - PHQ9
SUM OF ALL RESPONSES TO PHQ9 QUESTIONS 1 AND 2: 0
1. LITTLE INTEREST OR PLEASURE IN DOING THINGS: NOT AT ALL
2. FEELING DOWN, DEPRESSED OR HOPELESS: NOT AT ALL

## 2025-05-27 NOTE — PROGRESS NOTES
Subjective   Patient ID: Jenae Delvalle is a 75 y.o. female who presents for UTI (Pain in abdomen, frequent urination for few days, pt also fell last Thurs and hurt RT knee.).  HPI  Jenae presents for UTI symptoms and pain in her R knee     She states that she has UTI strips at home to test her urine, it was purple suggesting UTI  -She states she had gotten her first UTI at age 50   -Has not had a UTI for a long time, seems to get them when stressed   -Has been stressed with the knee, wonders if this brought on the UTI  -No dysuria  -Feels aching inside, has to go often and not a lot comes out   -Had macrobid that Dr. Mooney had given her leftover, likely old     R knee pain after falling  -Fell last Thursday (5 days ago), tripped, down on ceramic tile on the R knee  -Feels a little unstable   -Hit the knee first and then landed on belly   -It did not feel like it was going to give out going on the stairs   -Getting in and out of the car is painful   -Not terribly swollen   -Took baby aspirin 1-2 days ago       Surgical History[1]   Medical History[2]  Social History[3]     Review of Systems  10 point review of systems performed and is negative except as noted in the HPI.    Current Medications[4]     Objective   /77   Pulse 60   Temp 36.4 °C (97.5 °F)   SpO2 97%     Physical Exam  Vitals reviewed.   Constitutional:       Appearance: Normal appearance.   HENT:      Head: Normocephalic and atraumatic.   Eyes:      Conjunctiva/sclera: Conjunctivae normal.      Pupils: Pupils are equal, round, and reactive to light.   Cardiovascular:      Rate and Rhythm: Normal rate and regular rhythm.      Heart sounds: Normal heart sounds.   Pulmonary:      Effort: Pulmonary effort is normal.      Breath sounds: Normal breath sounds. No wheezing, rhonchi or rales.   Abdominal:      General: Bowel sounds are normal.      Palpations: Abdomen is soft.      Tenderness: There is no abdominal tenderness. There is no right CVA  tenderness or left CVA tenderness.   Musculoskeletal:      Right upper leg: Normal.      Right knee: Swelling (mild swelling) and ecchymosis present. No deformity, effusion or erythema. Normal range of motion. Tenderness (patella and medial side of R knee) present. No medial joint line, lateral joint line, MCL, LCL, ACL, PCL or patellar tendon tenderness.      Left knee: No swelling.      Right lower leg: Normal.   Neurological:      Mental Status: She is alert and oriented to person, place, and time.         Assessment & Plan  Frequency of urination  UA positive for only leuks  Will treat based on symptoms with macrobid   Urine culture ordered   Orders:    POCT UA Automated manually resulted    Urine Culture    nitrofurantoin, macrocrystal-monohydrate, (Macrobid) 100 mg capsule; Take 1 capsule (100 mg) by mouth 2 times a day for 5 days.    Acute pain of right knee  Xray of R knee today - Final result for imaging is still pending from Radiology.   Discussed ice, elevation   Discussed if instability persists we may need an MRI  Orders:    XR knee right 4+ views; Future    Osteopenia of multiple sites  DEXA ordered last year, she has yet to do  Orders:    XR DEXA bone density; Future    Encounter for screening mammogram for malignant neoplasm of breast  Updated mammo ordered   Orders:    BI mammo bilateral screening tomosynthesis; Future          Discussed at visit any disease processes that were of concern as well as the risks, benefits and instructions on any new medication provided. Patient (and/or caretaker of patient if present) stated all questions were answered, and they voiced understanding of instructions.      Alicia Bailon PA-C       [1]   Past Surgical History:  Procedure Laterality Date    CHOLECYSTECTOMY  03/21/2014    Cholecystectomy    COLONOSCOPY  11/14/2014    Colonoscopy (Fiberoptic)    ESOPHAGOGASTRODUODENOSCOPY  11/14/2014    Diagnostic Esophagogastroduodenoscopy    FOOT SURGERY  09/18/2018    Foot  Surgery    OTHER SURGICAL HISTORY  03/21/2014    Ostectomy Calcaneus For Spur    OTHER SURGICAL HISTORY  09/19/2019    Split thickness skin graft    TUBAL LIGATION  03/21/2014    Tubal Ligation   [2]   Past Medical History:  Diagnosis Date    Acute candidiasis of vulva and vagina 03/24/2015    Yeast vaginitis    Acute maxillary sinusitis, unspecified 11/29/2017    Acute non-recurrent maxillary sinusitis    Chronic rhinitis 06/06/2013    Rhinitis    COVID-19 04/09/2021    COVID-19 virus infection    Other conditions influencing health status 06/25/2013    Mammogram    Other conditions influencing health status 06/25/2013    Leiomyomatous Degeneration Of The Uterus    Personal history of neoplasm of uncertain behavior 03/21/2014    History of neoplasm of uncertain behavior    Personal history of other diseases of the digestive system 10/17/2014    History of acute gastritis    Personal history of other diseases of the respiratory system 11/29/2017    History of bronchitis    Personal history of other endocrine, nutritional and metabolic disease 03/21/2014    History of obesity    Personal history of other infectious and parasitic diseases 03/27/2015    History of athlete's foot    Personal history of other malignant neoplasm of skin     History of skin cancer    Personal history of other malignant neoplasm of skin     History of malignant neoplasm of skin    Personal history of other specified conditions 12/11/2017    History of dysuria    Personal history of urinary (tract) infections 12/22/2017    History of urinary tract infection   [3]   Social History  Tobacco Use    Smoking status: Never    Smokeless tobacco: Never   Vaping Use    Vaping status: Never Used   Substance Use Topics    Alcohol use: Never    Drug use: Never   [4]   Current Outpatient Medications:     bismuth subsalicylate (Pepto Bismol) 262 mg chewable tablet, Chew 1 tablet (262 mg) 4 times a day before meals., Disp: , Rfl:     cholecalciferol  (Vitamin D-3) 25 MCG (1000 UT) tablet, Take 1 tablet (1,000 Units) by mouth once daily., Disp: , Rfl:     cimetidine (Tagamet) 200 mg tablet, Take by mouth., Disp: , Rfl:     ib-cw-HB-vit I-lepwk-kery-zeax (Ocuvite Eye Plus Multi) 200- mcg tablet, Take by mouth., Disp: , Rfl:     triamterene-hydrochlorothiazid (Dyazide) 37.5-25 mg capsule, TAKE ONE CAPSULE BY MOUTH EVERY DAY, Disp: 90 capsule, Rfl: 3    zinc sulfate (Zincate) 220 (50 Zn) MG capsule, Take 1 capsule by mouth once daily., Disp: , Rfl:     nitrofurantoin, macrocrystal-monohydrate, (Macrobid) 100 mg capsule, Take 1 capsule (100 mg) by mouth 2 times a day for 5 days., Disp: 10 capsule, Rfl: 0    ondansetron (Zofran) 4 mg tablet, Take 1 tablet (4 mg) by mouth every 8 hours if needed for nausea. (Patient not taking: Reported on 5/27/2025), Disp: , Rfl:

## 2025-05-29 LAB — BACTERIA UR CULT: ABNORMAL

## 2025-06-05 ENCOUNTER — HOSPITAL ENCOUNTER (OUTPATIENT)
Dept: RADIOLOGY | Facility: HOSPITAL | Age: 75
Discharge: HOME | End: 2025-06-05
Payer: MEDICARE

## 2025-06-05 VITALS — WEIGHT: 251 LBS | BODY MASS INDEX: 41.82 KG/M2 | HEIGHT: 65 IN

## 2025-06-05 DIAGNOSIS — M85.89 OSTEOPENIA OF MULTIPLE SITES: ICD-10-CM

## 2025-06-05 DIAGNOSIS — Z12.31 ENCOUNTER FOR SCREENING MAMMOGRAM FOR MALIGNANT NEOPLASM OF BREAST: ICD-10-CM

## 2025-06-05 PROCEDURE — 77067 SCR MAMMO BI INCL CAD: CPT | Performed by: RADIOLOGY

## 2025-06-05 PROCEDURE — 77067 SCR MAMMO BI INCL CAD: CPT

## 2025-06-05 PROCEDURE — 77080 DXA BONE DENSITY AXIAL: CPT | Performed by: RADIOLOGY

## 2025-06-05 PROCEDURE — 77063 BREAST TOMOSYNTHESIS BI: CPT | Performed by: RADIOLOGY

## 2025-06-05 PROCEDURE — 77080 DXA BONE DENSITY AXIAL: CPT

## 2025-06-06 DIAGNOSIS — Z12.31 SCREENING MAMMOGRAM, ENCOUNTER FOR: ICD-10-CM

## 2025-06-06 RX ORDER — TRIAMTERENE AND HYDROCHLOROTHIAZIDE 37.5; 25 MG/1; MG/1
1 CAPSULE ORAL DAILY
Qty: 90 CAPSULE | Refills: 0 | Status: SHIPPED | OUTPATIENT
Start: 2025-06-06

## 2025-06-20 ENCOUNTER — APPOINTMENT (OUTPATIENT)
Dept: PRIMARY CARE | Facility: CLINIC | Age: 75
End: 2025-06-20
Payer: MEDICARE

## 2025-06-27 ENCOUNTER — APPOINTMENT (OUTPATIENT)
Dept: PRIMARY CARE | Facility: CLINIC | Age: 75
End: 2025-06-27
Payer: MEDICARE

## 2025-06-27 VITALS
DIASTOLIC BLOOD PRESSURE: 84 MMHG | BODY MASS INDEX: 42.82 KG/M2 | HEART RATE: 88 BPM | SYSTOLIC BLOOD PRESSURE: 136 MMHG | WEIGHT: 257 LBS | OXYGEN SATURATION: 97 % | HEIGHT: 65 IN

## 2025-06-27 DIAGNOSIS — I10 PRIMARY HYPERTENSION: ICD-10-CM

## 2025-06-27 DIAGNOSIS — E66.813 CLASS 3 SEVERE OBESITY DUE TO EXCESS CALORIES WITH SERIOUS COMORBIDITY AND BODY MASS INDEX (BMI) OF 40.0 TO 44.9 IN ADULT: ICD-10-CM

## 2025-06-27 DIAGNOSIS — Z00.00 ROUTINE GENERAL MEDICAL EXAMINATION AT HEALTH CARE FACILITY: Primary | ICD-10-CM

## 2025-06-27 DIAGNOSIS — H35.3211 EXUDATIVE AGE-RELATED MACULAR DEGENERATION OF RIGHT EYE WITH ACTIVE CHOROIDAL NEOVASCULARIZATION: ICD-10-CM

## 2025-06-27 DIAGNOSIS — M25.561 ACUTE PAIN OF RIGHT KNEE: ICD-10-CM

## 2025-06-27 DIAGNOSIS — E66.01 MORBID (SEVERE) OBESITY DUE TO EXCESS CALORIES (MULTI): ICD-10-CM

## 2025-06-27 DIAGNOSIS — R30.0 DYSURIA: ICD-10-CM

## 2025-06-27 DIAGNOSIS — E78.1 ESSENTIAL HYPERTRIGLYCERIDEMIA: ICD-10-CM

## 2025-06-27 DIAGNOSIS — H35.3230 BILATERAL EXUDATIVE AGE-RELATED MACULAR DEGENERATION, UNSPECIFIED STAGE: ICD-10-CM

## 2025-06-27 LAB
POC APPEARANCE, URINE: CLEAR
POC BILIRUBIN, URINE: NEGATIVE
POC BLOOD, URINE: NEGATIVE
POC COLOR, URINE: YELLOW
POC GLUCOSE, URINE: NEGATIVE MG/DL
POC KETONES, URINE: NEGATIVE MG/DL
POC LEUKOCYTES, URINE: ABNORMAL
POC NITRITE,URINE: NEGATIVE
POC PH, URINE: 6.5 PH
POC PROTEIN, URINE: NEGATIVE MG/DL
POC SPECIFIC GRAVITY, URINE: 1.02
POC UROBILINOGEN, URINE: 0.2 EU/DL

## 2025-06-27 PROCEDURE — 3008F BODY MASS INDEX DOCD: CPT

## 2025-06-27 PROCEDURE — 1036F TOBACCO NON-USER: CPT

## 2025-06-27 PROCEDURE — 1170F FXNL STATUS ASSESSED: CPT

## 2025-06-27 PROCEDURE — 99397 PER PM REEVAL EST PAT 65+ YR: CPT

## 2025-06-27 PROCEDURE — 3079F DIAST BP 80-89 MM HG: CPT

## 2025-06-27 PROCEDURE — G0439 PPPS, SUBSEQ VISIT: HCPCS

## 2025-06-27 PROCEDURE — 81003 URINALYSIS AUTO W/O SCOPE: CPT

## 2025-06-27 PROCEDURE — 1160F RVW MEDS BY RX/DR IN RCRD: CPT

## 2025-06-27 PROCEDURE — 3075F SYST BP GE 130 - 139MM HG: CPT

## 2025-06-27 PROCEDURE — 99213 OFFICE O/P EST LOW 20 MIN: CPT

## 2025-06-27 PROCEDURE — 1159F MED LIST DOCD IN RCRD: CPT

## 2025-06-27 ASSESSMENT — ACTIVITIES OF DAILY LIVING (ADL)
TAKING_MEDICATION: INDEPENDENT
MANAGING_FINANCES: INDEPENDENT
DOING_HOUSEWORK: INDEPENDENT
GROCERY_SHOPPING: INDEPENDENT
DRESSING: INDEPENDENT
BATHING: INDEPENDENT

## 2025-06-27 NOTE — ASSESSMENT & PLAN NOTE
Check labs, previous CMP did show reduced eGFR of 57  Triamterene-hydrochlorothiazide   Orders:    TSH with reflex to Free T4 if abnormal    Comprehensive metabolic panel    CBC and Auto Differential

## 2025-06-27 NOTE — PATIENT INSTRUCTIONS
Let me know if the swallowing gets worse - then can see Dr. Lujan again     Labs today     Can try claritin for the cough, see if this helps

## 2025-06-27 NOTE — PROGRESS NOTES
"Subjective   Reason for Visit: Jenae Delvalle is an 75 y.o. female here for a Medicare Wellness visit.     Past Medical, Surgical, and Family History reviewed and updated in chart.    Reviewed all medications by prescribing practitioner or clinical pharmacist (such as prescriptions, OTCs, herbal therapies and supplements) and documented in the medical record.    HPI  Jenae presents for medicare wellness visit     Knee is doing much better  -Did not wear the brace, 2 different braces fell down ankle  -Put a thick gauze pad, wrapepd it with an ace bandage   -Trying to rest it  -Much less painful   -Getting in and out of bed, getting in and out of the car is much better     UTI symptoms resolved  -Took a cranberry supplement and antibiotic     HTN  -Triamterene-hydrochlorothiazide     Has a cough  -Comes and goes   -Thinks it is just allergies   -Does not feel sick     Hiatal hernia   -Had EGD in 2021, was treated for h pylori   -Does act up at times, 3-4 weeks   -Takes tagamet only as she needs, awhile ago had an episode of food coming back up, this only happens not even once a month, maybe every other month     Macular degeneration   -Follows with [facility] Reminder:  -Blood Work: 6/27/25  -Hep C:   -Colonoscopy: 2021 due in 5 years - May 2026 she is due   -Mammo: 6/5/25  -Dexa >65y: 6/5/25 osteopenia   -Pap: >65  -Lung CA Screen: 50-80  -Shingrix >50: declines  -Pneumovax >65y, <65 if at risk, 5y between <65 and >65 dose:  -Flu: Yearly  -tdap:       Patient Care Team:  Alicia Bailon PA-C as PCP - General (Family Medicine)  Lei LAWRENCE DO as PCP - [employer] PCP  Jordan Mcneal MD as Referring Physician ([facility])     Review of Systems  10 point review of systems performed and is negative except as noted in the HPI.      Objective   Vitals:  /84   Pulse 88   Ht 1.651 m (5' 5\")   Wt 117 kg (257 lb)   SpO2 97%   BMI 42.77 kg/m²       Physical " Exam  Constitutional:       Appearance: Normal appearance.   HENT:      Head: Normocephalic and atraumatic.      Right Ear: Tympanic membrane, ear canal and external ear normal.      Left Ear: Tympanic membrane, ear canal and external ear normal.      Nose: Nose normal.      Mouth/Throat:      Mouth: Mucous membranes are moist.      Pharynx: Oropharynx is clear.   Eyes:      Conjunctiva/sclera: Conjunctivae normal.      Pupils: Pupils are equal, round, and reactive to light.   Cardiovascular:      Rate and Rhythm: Normal rate and regular rhythm.      Heart sounds: Normal heart sounds. No murmur heard.  Pulmonary:      Effort: Pulmonary effort is normal.      Breath sounds: Normal breath sounds. No wheezing, rhonchi or rales.   Musculoskeletal:         General: No swelling or tenderness (R knee is non-tender to palpation). Normal range of motion.      Right lower leg: No edema.      Left lower leg: No edema.   Skin:     General: Skin is warm and dry.      Findings: No bruising or erythema.   Neurological:      Mental Status: She is alert and oriented to person, place, and time.   Psychiatric:         Mood and Affect: Mood normal.         Behavior: Behavior normal.         Assessment & Plan  Routine general medical examination at health care facility         Essential hypertriglyceridemia  Check cholesterol panel, has had elevated cholesterol in the past but does not want to take medication   Orders:    Lipid Panel    Primary hypertension  Check labs, previous CMP did show reduced eGFR of 57  Triamterene-hydrochlorothiazide   Orders:    TSH with reflex to Free T4 if abnormal    Comprehensive metabolic panel    CBC and Auto Differential    Dysuria  Check UA and urine culture, previous did show group B strep, due to allergies she is limited on antibiotics, she was on macrobid which did help   Cannot take cephalosporins or penicillins   Orders:    POCT UA Automated manually resulted    Urine Culture    Morbid (severe)  obesity due to excess calories (Multi)  Class 3 severe obesity due to excess calories with serious comorbidity and body mass index (BMI) of 40.0 to 44.9 in adult  Diet changes          Exudative age-related macular degeneration of right eye with active choroidal neovascularization  Bilateral exudative age-related macular degeneration, unspecified stage  Follows with optho          Acute pain of right knee  From xray 5/27/25 she was found to have a nondisplaced fracture of the lateral patella facet   A knee brace was recommended as well as follow up imaging and seeing ortho - she declines          Discussed at visit any disease processes that were of concern as well as the risks, benefits and instructions on any new medication provided. Patient (and/or caretaker of patient if present) stated all questions were answered, and they voiced understanding of instructions.

## 2025-06-27 NOTE — ASSESSMENT & PLAN NOTE
Check cholesterol panel, has had elevated cholesterol in the past but does not want to take medication   Orders:    Lipid Panel

## 2025-06-29 LAB
ALBUMIN SERPL-MCNC: 4.4 G/DL (ref 3.6–5.1)
ALP SERPL-CCNC: 105 U/L (ref 37–153)
ALT SERPL-CCNC: 18 U/L (ref 6–29)
ANION GAP SERPL CALCULATED.4IONS-SCNC: 9 MMOL/L (CALC) (ref 7–17)
AST SERPL-CCNC: 20 U/L (ref 10–35)
BACTERIA UR CULT: ABNORMAL
BASOPHILS # BLD AUTO: 29 CELLS/UL (ref 0–200)
BASOPHILS NFR BLD AUTO: 0.5 %
BILIRUB SERPL-MCNC: 0.8 MG/DL (ref 0.2–1.2)
BUN SERPL-MCNC: 21 MG/DL (ref 7–25)
CALCIUM SERPL-MCNC: 9 MG/DL (ref 8.6–10.4)
CHLORIDE SERPL-SCNC: 102 MMOL/L (ref 98–110)
CHOLEST SERPL-MCNC: 242 MG/DL
CHOLEST/HDLC SERPL: 4.8 (CALC)
CO2 SERPL-SCNC: 28 MMOL/L (ref 20–32)
CREAT SERPL-MCNC: 0.99 MG/DL (ref 0.6–1)
EGFRCR SERPLBLD CKD-EPI 2021: 60 ML/MIN/1.73M2
EOSINOPHIL # BLD AUTO: 143 CELLS/UL (ref 15–500)
EOSINOPHIL NFR BLD AUTO: 2.5 %
ERYTHROCYTE [DISTWIDTH] IN BLOOD BY AUTOMATED COUNT: 16.4 % (ref 11–15)
GLUCOSE SERPL-MCNC: 95 MG/DL (ref 65–99)
HCT VFR BLD AUTO: 46.2 % (ref 35–45)
HDLC SERPL-MCNC: 50 MG/DL
HGB BLD-MCNC: 14.4 G/DL (ref 11.7–15.5)
LDLC SERPL CALC-MCNC: 164 MG/DL (CALC)
LYMPHOCYTES # BLD AUTO: 1699 CELLS/UL (ref 850–3900)
LYMPHOCYTES NFR BLD AUTO: 29.8 %
MCH RBC QN AUTO: 26.4 PG (ref 27–33)
MCHC RBC AUTO-ENTMCNC: 31.2 G/DL (ref 32–36)
MCV RBC AUTO: 84.8 FL (ref 80–100)
MONOCYTES # BLD AUTO: 342 CELLS/UL (ref 200–950)
MONOCYTES NFR BLD AUTO: 6 %
NEUTROPHILS # BLD AUTO: 3488 CELLS/UL (ref 1500–7800)
NEUTROPHILS NFR BLD AUTO: 61.2 %
NONHDLC SERPL-MCNC: 192 MG/DL (CALC)
PLATELET # BLD AUTO: 275 THOUSAND/UL (ref 140–400)
PMV BLD REES-ECKER: 8.8 FL (ref 7.5–12.5)
POTASSIUM SERPL-SCNC: 4.1 MMOL/L (ref 3.5–5.3)
PROT SERPL-MCNC: 7.3 G/DL (ref 6.1–8.1)
RBC # BLD AUTO: 5.45 MILLION/UL (ref 3.8–5.1)
SODIUM SERPL-SCNC: 139 MMOL/L (ref 135–146)
TRIGL SERPL-MCNC: 138 MG/DL
TSH SERPL-ACNC: 2.79 MIU/L (ref 0.4–4.5)
WBC # BLD AUTO: 5.7 THOUSAND/UL (ref 3.8–10.8)

## 2025-07-02 PROBLEM — N18.31 STAGE 3A CHRONIC KIDNEY DISEASE (MULTI): Status: RESOLVED | Noted: 2024-06-21 | Resolved: 2025-07-02

## 2025-07-02 PROBLEM — E66.01 MORBID (SEVERE) OBESITY DUE TO EXCESS CALORIES (MULTI): Status: ACTIVE | Noted: 2025-07-02

## 2025-07-02 PROBLEM — E66.813 CLASS 3 SEVERE OBESITY DUE TO EXCESS CALORIES WITH SERIOUS COMORBIDITY AND BODY MASS INDEX (BMI) OF 40.0 TO 44.9 IN ADULT: Status: ACTIVE | Noted: 2025-07-02

## 2025-08-28 ENCOUNTER — OFFICE VISIT (OUTPATIENT)
Dept: PRIMARY CARE | Facility: CLINIC | Age: 75
End: 2025-08-28
Payer: MEDICARE

## 2025-08-28 VITALS
SYSTOLIC BLOOD PRESSURE: 128 MMHG | DIASTOLIC BLOOD PRESSURE: 68 MMHG | BODY MASS INDEX: 42.77 KG/M2 | OXYGEN SATURATION: 97 % | HEART RATE: 56 BPM | WEIGHT: 257 LBS

## 2025-08-28 DIAGNOSIS — R20.8 BURNING SENSATION OF MOUTH: Primary | ICD-10-CM

## 2025-08-28 DIAGNOSIS — Z86.39 HX OF NUTRITIONAL DEFICIENCY: ICD-10-CM

## 2025-08-28 PROCEDURE — 1159F MED LIST DOCD IN RCRD: CPT

## 2025-08-28 PROCEDURE — 3074F SYST BP LT 130 MM HG: CPT

## 2025-08-28 PROCEDURE — 99213 OFFICE O/P EST LOW 20 MIN: CPT

## 2025-08-28 PROCEDURE — 1036F TOBACCO NON-USER: CPT

## 2025-08-28 PROCEDURE — 3078F DIAST BP <80 MM HG: CPT

## 2025-08-28 RX ORDER — NYSTATIN 100000 [USP'U]/ML
500000 SUSPENSION ORAL 4 TIMES DAILY
Qty: 280 ML | Refills: 0 | Status: SHIPPED | OUTPATIENT
Start: 2025-08-28 | End: 2025-09-11

## 2025-08-28 ASSESSMENT — PATIENT HEALTH QUESTIONNAIRE - PHQ9
SUM OF ALL RESPONSES TO PHQ9 QUESTIONS 1 AND 2: 0
2. FEELING DOWN, DEPRESSED OR HOPELESS: NOT AT ALL
1. LITTLE INTEREST OR PLEASURE IN DOING THINGS: NOT AT ALL

## 2025-08-29 LAB
FOLATE SERPL-MCNC: 11.2 NG/ML
IRON SATN MFR SERPL: 12 % (CALC) (ref 16–45)
IRON SERPL-MCNC: 45 MCG/DL (ref 45–160)
TIBC SERPL-MCNC: 364 MCG/DL (CALC) (ref 250–450)
VIT B12 SERPL-MCNC: 554 PG/ML (ref 200–1100)

## 2025-09-05 ENCOUNTER — OFFICE VISIT (OUTPATIENT)
Dept: SURGERY | Facility: CLINIC | Age: 75
End: 2025-09-05
Payer: MEDICARE

## 2025-09-05 VITALS
BODY MASS INDEX: 43.49 KG/M2 | DIASTOLIC BLOOD PRESSURE: 79 MMHG | OXYGEN SATURATION: 96 % | WEIGHT: 261 LBS | HEIGHT: 65 IN | HEART RATE: 61 BPM | SYSTOLIC BLOOD PRESSURE: 132 MMHG

## 2025-09-05 DIAGNOSIS — R47.02 DYSPHASIA: Primary | ICD-10-CM

## 2025-09-05 PROCEDURE — 99204 OFFICE O/P NEW MOD 45 MIN: CPT | Performed by: SURGERY

## 2025-09-05 PROCEDURE — 3008F BODY MASS INDEX DOCD: CPT | Performed by: SURGERY

## 2025-09-05 PROCEDURE — 3075F SYST BP GE 130 - 139MM HG: CPT | Performed by: SURGERY

## 2025-09-05 PROCEDURE — 3078F DIAST BP <80 MM HG: CPT | Performed by: SURGERY

## (undated) DEVICE — GLOVE SURG 7 LTX TRIFLEX WIDE FINGER PWDR

## (undated) DEVICE — BONE MARROW ASPIRATION KIT: Brand: PROGRESS

## (undated) DEVICE — SOLUTION IRRIG 1000ML 09% SOD CHL USP PIC PLAS CONTAINER

## (undated) DEVICE — ENCORE® LATEX TEXTURED SIZE 7.5, STERILE LATEX POWDER-FREE SURGICAL GLOVE: Brand: ENCORE

## (undated) DEVICE — DRESSING GZ XRFRM 4X4(25/BX 6BX/CS)

## (undated) DEVICE — INTENDED FOR TISSUE SEPARATION, AND OTHER PROCEDURES THAT REQUIRE A SHARP SURGICAL BLADE TO PUNCTURE OR CUT.: Brand: BARD-PARKER ® STAINLESS STEEL BLADES

## (undated) DEVICE — TOWEL OR BLUEE 16X26IN ST 8 PACK ORB08 16X26ORTWL

## (undated) DEVICE — STEINMANN PIN: Brand: INBONE

## (undated) DEVICE — SOLUTION IV IRRIG POUR BRL 0.9% SODIUM CHL 2F7124

## (undated) DEVICE — HANDLE CVR PATENTED RETENTION DISC STRL LIGHT SHLD

## (undated) DEVICE — SUTURE MCRYL SZ 3-0 L27IN ABSRB UD L26MM SH 1/2 CIR Y416H

## (undated) DEVICE — TIBURON EXTREMITY SHEET: Brand: CONVERTORS

## (undated) DEVICE — BASIC PACK: Brand: CONVERTORS

## (undated) DEVICE — GLOVE SURG 7.5 LTX TRIFLEX WIDE FINGER PWDR

## (undated) DEVICE — NEEDLE HYPO 25GA L1.5IN BLU POLYPR HUB S STL REG BVL STR

## (undated) DEVICE — 1810 FOAM BLOCK NEEDLE COUNTER: Brand: DEVON

## (undated) DEVICE — SYRINGE BLB 50CC IRRIG PLIABLE FNGR FLNG GRAD FLSK DISP

## (undated) DEVICE — HOOK LOCK LATEX FREE ELASTIC BANDAGE 3INX5YD

## (undated) DEVICE — Z CONVERTED USE 2275207 CLOTH PREP W7.5XL7.5IN 2% CHG SKIN ALC AND RNS FREE

## (undated) DEVICE — MEDI-VAC NON-CONDUCTIVE SUCTION TUBING: Brand: CARDINAL HEALTH

## (undated) DEVICE — BANDAGE,GAUZE,BULKEE II,4.5"X4.1YD,STRL: Brand: MEDLINE

## (undated) DEVICE — SUTURE ETHLN SZ 4-0 L18IN NONABSORBABLE BLK L19MM PS-2 3/8 1667H

## (undated) DEVICE — DRAPE 84X54IN RADIOLOGY C ARM KEYBOARD

## (undated) DEVICE — CHLORAPREP 26ML ORANGE

## (undated) DEVICE — GAUZE,SPONGE,4"X4",16PLY,STRL,LF,10/TRAY: Brand: MEDLINE

## (undated) DEVICE — PEN: MARKING STD 100/CS: Brand: MEDICAL ACTION INDUSTRIES

## (undated) DEVICE — GAUZE,SPONGE,4"X4",16PLY,XRAY,STRL,LF: Brand: MEDLINE